# Patient Record
Sex: MALE | Race: BLACK OR AFRICAN AMERICAN | NOT HISPANIC OR LATINO | Employment: FULL TIME | URBAN - METROPOLITAN AREA
[De-identification: names, ages, dates, MRNs, and addresses within clinical notes are randomized per-mention and may not be internally consistent; named-entity substitution may affect disease eponyms.]

---

## 2018-09-19 ENCOUNTER — HOSPITAL ENCOUNTER (EMERGENCY)
Facility: HOSPITAL | Age: 18
Discharge: HOME | End: 2018-09-19
Attending: INTERNAL MEDICINE
Payer: MEDICAID

## 2018-09-19 VITALS
HEIGHT: 70 IN | HEART RATE: 85 BPM | WEIGHT: 135 LBS | DIASTOLIC BLOOD PRESSURE: 68 MMHG | SYSTOLIC BLOOD PRESSURE: 114 MMHG | TEMPERATURE: 98 F | RESPIRATION RATE: 17 BRPM | BODY MASS INDEX: 19.33 KG/M2 | OXYGEN SATURATION: 100 %
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
## 2018-09-19 DIAGNOSIS — L03.90 CELLULITIS, UNSPECIFIED CELLULITIS SITE: ICD-10-CM

## 2018-09-19 PROCEDURE — 99283 EMERGENCY DEPT VISIT LOW MDM: CPT

## 2018-09-19 RX ORDER — CETIRIZINE HYDROCHLORIDE 10 MG/1
10 TABLET ORAL DAILY PRN
Qty: 30 TABLET | Refills: 0 | Status: SHIPPED | OUTPATIENT
Start: 2018-09-19 | End: 2018-10-19

## 2018-09-19 RX ORDER — CLINDAMYCIN HYDROCHLORIDE 300 MG/1
300 CAPSULE ORAL EVERY 6 HOURS
Qty: 40 CAPSULE | Refills: 0 | Status: SHIPPED | OUTPATIENT
Start: 2018-09-19 | End: 2018-09-29

## 2018-09-20 NOTE — ED PROVIDER NOTES
"Encounter Date: 9/19/2018    SCRIBE #1 NOTE: I, Nicanor West, am scribing for, and in the presence of,  LANDY Costa. I have scribed the following portions of the note - Other sections scribed: HPI, ROS, PE.       History     Chief Complaint   Patient presents with    Leg Pain     red, raised lesion to right lower leg. Pt states he thinks he "got bit by a spider,'     This is a 18 y.o. male who presents to the ED with a complaint of redness and swelling to the right lower leg that began earlier today at 1 or 2 PM. Patient thinks that he was bit by a spider and notes that his symptoms of the redness and swelling have progressively worsened since the onset of his bite.  He notes that this area itched intially, but denies any itchiness currently.   He notes that palpating the area worsens this area of pain, and that nothing makes it better. He denies taking any medication since his problems began.  He denies fever, chills chest pains or shortness of breath.      The history is provided by the patient.     Review of patient's allergies indicates:  No Known Allergies  History reviewed. No pertinent past medical history.  History reviewed. No pertinent surgical history.  History reviewed. No pertinent family history.  Social History     Tobacco Use    Smoking status: Never Smoker    Smokeless tobacco: Never Used   Substance Use Topics    Alcohol use: No    Drug use: No     Review of Systems   Constitutional: Negative.  Negative for activity change and fever.   HENT: Negative.  Negative for trouble swallowing.    Eyes: Negative.  Negative for visual disturbance.   Respiratory: Negative.  Negative for shortness of breath.    Cardiovascular: Negative.  Negative for chest pain and palpitations.   Gastrointestinal: Negative.  Negative for abdominal pain.   Endocrine: Negative.    Genitourinary: Negative.    Musculoskeletal: Negative.         Positive for an area of swelling to the right lower leg   Skin: Positive for " color change (Redness) and wound (Insect bite).   Allergic/Immunologic: Negative.    Neurological: Negative.    Hematological: Negative.    All other systems reviewed and are negative.      Physical Exam     Initial Vitals [09/19/18 2037]   BP Pulse Resp Temp SpO2   116/71 89 18 98.3 °F (36.8 °C) (!) 89 %      MAP       --         Physical Exam    Nursing note and vitals reviewed.  Constitutional: He appears well-developed and well-nourished.   HENT:   Head: Normocephalic.   Right Ear: External ear normal.   Left Ear: External ear normal.   Nose: Nose normal.   Mouth/Throat: Uvula is midline, oropharynx is clear and moist and mucous membranes are normal.   Eyes: Conjunctivae are normal.   Neck: Normal range of motion. Neck supple.   Cardiovascular: Normal rate, regular rhythm, normal heart sounds and intact distal pulses. Exam reveals no gallop and no friction rub.    No murmur heard.  Pulmonary/Chest: Breath sounds normal. No respiratory distress. He has no wheezes. He has no rhonchi. He has no rales.   Abdominal: Soft. Bowel sounds are normal. He exhibits no distension and no mass. There is no tenderness. There is no rebound and no guarding.   Musculoskeletal: Normal range of motion.        Legs:  1 cm x 1 cm erythematous area, warm to touch, tender to palpation. No fluctuance or induration noted.   Neurological: He is alert and oriented to person, place, and time.   Skin: Skin is warm and dry. Capillary refill takes less than 2 seconds. There is erythema.   Erythematous area that is warm to the touch with associated tenderness.    Psychiatric: He has a normal mood and affect. His behavior is normal.         ED Course   Procedures              Medical Decision Making:   Initial Assessment:   A 18-year-old male presents to the ED with complaints of insect bite to right lower extremity.  Patient states the bite happened around 1:00 or 2:00 p.m. at work.  Patient unsure of with bit him.  He denies fever, chills chest  pains or shortness of breath.  Differential Diagnosis:   Insect bite  Infected insect bite  Allergic reaction  ED Management:  Physical exam.  Patient discharged home with clindamycin and Zyrtec.  Follow-up with PCP in 1-2 days.  Return ED for worsening of symptoms.            Scribe Attestation:   Scribe #1: I performed the above scribed service and the documentation accurately describes the services I performed. I attest to the accuracy of the note.               Clinical Impression:     1. Insect bite, initial encounter    2. Cellulitis, unspecified cellulitis site                                   LANDY Savage  09/19/18 3493

## 2022-04-19 ENCOUNTER — HOSPITAL ENCOUNTER (EMERGENCY)
Facility: HOSPITAL | Age: 22
Discharge: HOME | End: 2022-04-19
Attending: EMERGENCY MEDICINE

## 2022-04-19 VITALS
DIASTOLIC BLOOD PRESSURE: 89 MMHG | BODY MASS INDEX: 20.04 KG/M2 | OXYGEN SATURATION: 100 % | SYSTOLIC BLOOD PRESSURE: 142 MMHG | HEART RATE: 77 BPM | RESPIRATION RATE: 19 BRPM | TEMPERATURE: 99 F | WEIGHT: 140 LBS | HEIGHT: 70 IN

## 2022-04-19 DIAGNOSIS — S59.901A ELBOW INJURY, RIGHT, INITIAL ENCOUNTER: ICD-10-CM

## 2022-04-19 DIAGNOSIS — S42.401A CLOSED FRACTURE OF RIGHT ELBOW, INITIAL ENCOUNTER: Primary | ICD-10-CM

## 2022-04-19 PROCEDURE — 25000003 PHARM REV CODE 250: Mod: ER | Performed by: PHYSICIAN ASSISTANT

## 2022-04-19 PROCEDURE — 99283 EMERGENCY DEPT VISIT LOW MDM: CPT | Mod: 25,ER

## 2022-04-19 PROCEDURE — 29105 APPLICATION LONG ARM SPLINT: CPT | Mod: RT,ER

## 2022-04-19 RX ORDER — OXYCODONE AND ACETAMINOPHEN 5; 325 MG/1; MG/1
1 TABLET ORAL EVERY 6 HOURS PRN
Qty: 12 TABLET | Refills: 0 | Status: SHIPPED | OUTPATIENT
Start: 2022-04-19 | End: 2022-04-22

## 2022-04-19 RX ORDER — ACETAMINOPHEN 500 MG
1000 TABLET ORAL
Status: COMPLETED | OUTPATIENT
Start: 2022-04-19 | End: 2022-04-19

## 2022-04-19 RX ADMIN — ACETAMINOPHEN 1000 MG: 500 TABLET ORAL at 01:04

## 2022-04-19 NOTE — FIRST PROVIDER EVALUATION
Emergency Department TeleTriage Encounter Note      CHIEF COMPLAINT    Chief Complaint   Patient presents with    Elbow Pain     Pt was playing basketball and fell and caught himself with his right arm. Pt states he is having right elbow pain since       VITAL SIGNS   Initial Vitals [04/19/22 1228]   BP Pulse Resp Temp SpO2   132/85 73 18 98.3 °F (36.8 °C) 100 %      MAP       --            ALLERGIES    Review of patient's allergies indicates:   Allergen Reactions    Advil [ibuprofen]     Aleve [naproxen sodium]        PROVIDER TRIAGE NOTE    Patient presents to the ER with complaint of right elbow pain since an injury while playing basketball yesterday.  He fell back and caught himself with his right arm.  He has not taken any pain medications, pain is currently rated 7/10.  Will order motrin and xray of right elbow and forearm pending ED provider evaluation.     Initial orders will be placed and care will be transferred to an alternate provider when patient is roomed for a full evaluation. Any additional orders and the final disposition will be determined by that provider.         ORDERS  Labs Reviewed - No data to display    ED Orders (720h ago, onward)    None            Virtual Visit Note: The provider triage portion of this emergency department evaluation and documentation was performed via [employer], a HIPAA-compliant telemedicine application, in concert with a tele-presenter in the room. A face to face patient evaluation with one of my colleagues will occur once the patient is placed in an emergency department room.      DISCLAIMER: This note was prepared with [employer] voice recognition transcription software. Garbled syntax, mangled pronouns, and other bizarre constructions may be attributed to that software system.

## 2022-04-19 NOTE — Clinical Note
"Tarik Shaffer"Mick was seen and treated in our emergency department on 4/19/2022.  He may return to work on 04/21/2022.       If you have any questions or concerns, please don't hesitate to call.      LANDY Perez"

## 2022-04-19 NOTE — ED PROVIDER NOTES
Encounter Date: 4/19/2022       History     Chief Complaint   Patient presents with    Elbow Pain     Pt was playing basketball and fell and caught himself with his right arm. Pt states he is having right elbow pain since     21 y/o male presents to the ED with complaints of right elbow pain.  Patient states that he was playing basketball yesterday and fell landing on the elbow.  Has been having pain since.  Patient denies head injury, LOC, AMS, neck pain, rash, fever, chest pain, SOB, numbness, weakness, tingling, abdominal pain, back pain, dysuria, hematuria, nausea, vomiting, diarrhea, or any other complaints.  Patient rates the pain as 8/10 and has not taken any medications for the symptoms.  No Alleviating/aggravating factors.            The history is provided by the patient.     Review of patient's allergies indicates:   Allergen Reactions    Advil [ibuprofen]     Aleve [naproxen sodium]      History reviewed. No pertinent past medical history.  History reviewed. No pertinent surgical history.  History reviewed. No pertinent family history.  Social History     Tobacco Use    Smoking status: Never Smoker    Smokeless tobacco: Never Used   Substance Use Topics    Alcohol use: No    Drug use: No     Review of Systems   Constitutional: Negative for chills and fever.   HENT: Negative for congestion, ear pain, rhinorrhea and sore throat.    Eyes: Negative for pain, discharge and redness.   Respiratory: Negative for cough and shortness of breath.    Cardiovascular: Negative for chest pain.   Gastrointestinal: Negative for abdominal pain, diarrhea, nausea and vomiting.   Genitourinary: Negative for dysuria.   Musculoskeletal: Positive for arthralgias. Negative for back pain, neck pain and neck stiffness.   Skin: Negative for rash.   Neurological: Negative for dizziness, weakness, light-headedness, numbness and headaches.   Psychiatric/Behavioral: Negative for confusion.       Physical Exam     Initial Vitals  [04/19/22 1228]   BP Pulse Resp Temp SpO2   132/85 73 18 98.3 °F (36.8 °C) 100 %      MAP       --         Physical Exam    Nursing note and vitals reviewed.  Constitutional: Vital signs are normal. He appears well-developed. He is cooperative.  Non-toxic appearance. He does not appear ill.   HENT:   Head: Normocephalic and atraumatic.   Right Ear: External ear normal.   Left Ear: External ear normal.   Nose: Nose normal.   Mouth/Throat: Oropharynx is clear and moist.   Eyes: Conjunctivae are normal.   Neck:   Normal range of motion.  Cardiovascular: Normal rate and regular rhythm.   Pulmonary/Chest: Effort normal and breath sounds normal.   Musculoskeletal:      Right elbow: Swelling present. Decreased range of motion. Tenderness present.      Cervical back: Normal range of motion.      Comments: Right elbow tenderness to palpation with mild swelling; decreased ROM due to pain, strength, and sensation; + radial pulse; capillary refill < 2 seconds; no erythema rash, bruising, or obvious deformity     Neurological: He is alert and oriented to person, place, and time. GCS eye subscore is 4. GCS verbal subscore is 5. GCS motor subscore is 6.   Skin: Skin is warm, dry and intact. No rash noted.   Psychiatric: He has a normal mood and affect. His speech is normal and behavior is normal. Thought content normal.         ED Course   Orthopedic Injury    Date/Time: 4/19/2022 2:29 PM  Performed by: LANDY Perez  Authorized by: Debora Munoz MD     Location procedure was performed:  [facility] EMERGENCY DEPARTMENT  Injury:     Injury location:  Elbow    Location details:  Right elbow    Injury type:  Fracture    Fracture type: radial head      Fracture type: radial head        Pre-procedure assessment:     Neurovascular status: Neurovascularly intact      Distal perfusion: normal      Neurological function: normal      Range of motion: reduced        Selections made in this section will also lock the Injury type section above.:      Manipulation performed?: No      Immobilization:  Splint and sling    Splint type:  Long arm    Supplies used:  Ortho-Glass    Complications: No      Estimated blood loss (mL):  0    Specimens: No      Implants: No    Post-procedure assessment:     Neurovascular status: Neurovascularly intact      Distal perfusion: normal      Neurological function: normal      Range of motion: splinted      Patient tolerance:  Patient tolerated the procedure well with no immediate complications      Labs Reviewed - No data to display       Imaging Results          X-Ray Elbow Complete Right (Final result)  Result time 04/19/22 13:44:14    Final result by Giuseppe Aragon MD (04/19/22 13:44:14)                 Impression:      Questionable nonspecific elbow joint effusion and questionable small avulsion/chip fracture of the medial epicondyle versus projectional artifact, and in the setting of recent trauma occult radial head fracture not excluded.      Electronically signed by: Giuseppe Aragon MD  Date:    04/19/2022  Time:    13:44             Narrative:    EXAMINATION:  XR ELBOW COMPLETE 3 VIEW RIGHT; XR FOREARM RIGHT    CLINICAL HISTORY:  . Unspecified injury of right elbow, initial encounter    TECHNIQUE:  AP, lateral, and oblique views of the right elbow; AP and lateral views right forearm    COMPARISON:  None    FINDINGS:  Bones are well mineralized.  Slight ulnar minus variance.  Slight prominence of the anterior and posterior fat pads which may reflect an elbow joint effusion.  Small linear ossific density adjacent to the medial condyle/epicondylar region without donor site seen which could represent artifact from overlapping osseous interface versus small chip/avulsion fracture.  No dislocation or destructive osseous process identified.  Joint spaces appear relatively maintained.  No subcutaneous emphysema or radiodense retained foreign body.                               X-Ray Forearm Right (Final result)  Result time  04/19/22 13:44:14    Final result by Giuseppe Aragon MD (04/19/22 13:44:14)                 Impression:      Questionable nonspecific elbow joint effusion and questionable small avulsion/chip fracture of the medial epicondyle versus projectional artifact, and in the setting of recent trauma occult radial head fracture not excluded.      Electronically signed by: Giuseppe Aragon MD  Date:    04/19/2022  Time:    13:44             Narrative:    EXAMINATION:  XR ELBOW COMPLETE 3 VIEW RIGHT; XR FOREARM RIGHT    CLINICAL HISTORY:  . Unspecified injury of right elbow, initial encounter    TECHNIQUE:  AP, lateral, and oblique views of the right elbow; AP and lateral views right forearm    COMPARISON:  None    FINDINGS:  Bones are well mineralized.  Slight ulnar minus variance.  Slight prominence of the anterior and posterior fat pads which may reflect an elbow joint effusion.  Small linear ossific density adjacent to the medial condyle/epicondylar region without donor site seen which could represent artifact from overlapping osseous interface versus small chip/avulsion fracture.  No dislocation or destructive osseous process identified.  Joint spaces appear relatively maintained.  No subcutaneous emphysema or radiodense retained foreign body.                                 Medications   acetaminophen tablet 1,000 mg (1,000 mg Oral Given 4/19/22 1302)           APC / Resident Notes:   This is an evaluation of a 22 y.o. male that presents to the Emergency Department for rigth elbow injury    Physical Exam shows a non-toxic, afebrile, and well appearing male. Right elbow tenderness to palpation with mild swelling; decreased ROM due to pain, strength, and sensation; + radial pulse; capillary refill < 2 seconds; no erythema rash, bruising, or obvious deformity    Vital signs are reassuring. If available, previous records reviewed.   RESULTS: Xray showed a questionable right elbow fracture     My overall impression is right elbow  fracture. I considered, but at this time, do not suspect dislocation, septic joint, abscess, cellulitis.    ED Course: Xray, Ice, Tylenol, splint, sling. Discharge Meds/Instructions: Percocet, ortho referral placed, disk given to patient. The diagnosis, treatment plan, instructions for follow-up as well as ED return precautions were discussed. All questions have been answered.     This case was discussed with Dr. Munoz who is in agreement with my assessment and plan.                    Clinical Impression:   Final diagnoses:  [S59.901A] Elbow injury, right, initial encounter  [S42.401A] Closed fracture of right elbow, initial encounter (Primary)          ED Disposition Condition    Discharge Stable        ED Prescriptions     Medication Sig Dispense Start Date End Date Auth. Provider    oxyCODONE-acetaminophen (PERCOCET) 5-325 mg per tablet Take 1 tablet by mouth every 6 (six) hours as needed for Pain. 12 tablet 4/19/2022 4/22/2022 LANDY Perez        Follow-up Information     Follow up With Specialties Details Why Contact [facility] - Musculosketetal Neuromusculoskeletal Medicine Schedule an appointment as soon as possible for a visit in 2 days  [address] [facility] [address]  [phone]      Amy Vincent MD Orthopedic Surgery Schedule an appointment as soon as possible for a visit in 2 days  [address] [facility] [address] [phone]      [facility] ED Emergency Medicine Go to  If symptoms worsen [address] [facility] [address] [phone]           LANDY Perez  04/19/22 3446

## 2022-04-27 ENCOUNTER — HOSPITAL ENCOUNTER (EMERGENCY)
Facility: HOSPITAL | Age: 22
Discharge: HOME | End: 2022-04-27
Attending: EMERGENCY MEDICINE

## 2022-04-27 VITALS
TEMPERATURE: 98 F | HEART RATE: 60 BPM | WEIGHT: 140 LBS | RESPIRATION RATE: 18 BRPM | SYSTOLIC BLOOD PRESSURE: 100 MMHG | OXYGEN SATURATION: 100 % | DIASTOLIC BLOOD PRESSURE: 79 MMHG | BODY MASS INDEX: 20.09 KG/M2

## 2022-04-27 DIAGNOSIS — S42.401D CLOSED FRACTURE DISLOCATION OF RIGHT ELBOW WITH ROUTINE HEALING, SUBSEQUENT ENCOUNTER: Primary | ICD-10-CM

## 2022-04-27 PROCEDURE — 25000003 PHARM REV CODE 250: Mod: ER | Performed by: EMERGENCY MEDICINE

## 2022-04-27 PROCEDURE — 99284 EMERGENCY DEPT VISIT MOD MDM: CPT | Mod: ER

## 2022-04-27 RX ORDER — CYCLOBENZAPRINE HCL 10 MG
10 TABLET ORAL 3 TIMES DAILY PRN
Qty: 15 TABLET | Refills: 0 | Status: SHIPPED | OUTPATIENT
Start: 2022-04-27 | End: 2022-05-02

## 2022-04-27 RX ORDER — TRAMADOL HYDROCHLORIDE 50 MG/1
50 TABLET ORAL EVERY 6 HOURS PRN
Qty: 10 TABLET | Refills: 0 | Status: SHIPPED | OUTPATIENT
Start: 2022-04-27

## 2022-04-27 RX ORDER — IBUPROFEN 600 MG/1
600 TABLET ORAL EVERY 6 HOURS PRN
Qty: 20 TABLET | Refills: 0 | Status: SHIPPED | OUTPATIENT
Start: 2022-04-27

## 2022-04-27 RX ORDER — KETOROLAC TROMETHAMINE 10 MG/1
10 TABLET, FILM COATED ORAL
Status: COMPLETED | OUTPATIENT
Start: 2022-04-27 | End: 2022-04-27

## 2022-04-27 RX ORDER — ACETAMINOPHEN 500 MG
1000 TABLET ORAL EVERY 6 HOURS PRN
Qty: 30 TABLET | Refills: 0 | Status: SHIPPED | OUTPATIENT
Start: 2022-04-27 | End: 2023-10-24 | Stop reason: SDUPTHER

## 2022-04-27 RX ADMIN — KETOROLAC TROMETHAMINE 10 MG: 10 TABLET, FILM COATED ORAL at 06:04

## 2022-04-27 NOTE — Clinical Note
"Tarik "Tarik"Mick was seen and treated in our emergency department on 4/27/2022.  He may return to work on 04/29/2022.       If you have any questions or concerns, please don't hesitate to call.      Roro Vasquez, DO"
17

## 2022-04-27 NOTE — FIRST PROVIDER EVALUATION
Medical screening exam completed.  I have conducted a focused provider triage encounter, findings are as follows:    Brief history of present illness:  Fracture to right elbow on 4/20/2022; out of percocet, requesting a refill for pain meds; has appointment with ortho in 2 weeks; no new injury and no worsening pain    Vitals:    04/27/22 1624   BP: 112/76   BP Location: Right arm   Patient Position: Sitting   Pulse: 60   Resp: 16   Temp: 98 °F (36.7 °C)   TempSrc: Oral   SpO2: 100%   Weight: 63.5 kg (140 lb)       Pertinent physical exam:  Long arm splint in place on the right    Brief workup plan:  Will defer further treatment to provider seeing patient in room    Preliminary workup initiated; this workup will be continued and followed by the physician or advanced practice provider that is assigned to the patient when roomed.

## 2022-04-27 NOTE — ED PROVIDER NOTES
"Encounter Date: 4/27/2022    SCRIBE #1 NOTE: I, Jim Choudhary, am scribing for, and in the presence of,  Roro Vasquez DO. I have scribed the following portions of the note - Other sections scribed: HPI, ROS, PE.       History     Chief Complaint   Patient presents with    Med Change     Pt states, " I want more and stronger pain medication for my arm."     22 y.o. male, with a splint on the left arm, presents to the ED for chief complaint of medication adjustment. Patient reports he was prescribed Percocet for a fractured elbow that occurred approximately 9 days ago. Patient requests a medication adjustment of Percocet which he reports is not relieving his pain. Patient notes he is able to take Ibuprofen without complications but states when he takes specific Advil brand, he reports his eye gets low but there is no erythema, pain, rash, or swelling.         The history is provided by the patient. No  was used.     Review of patient's allergies indicates:   Allergen Reactions    Advil [ibuprofen]     Aleve [naproxen sodium]      No past medical history on file.  No past surgical history on file.  No family history on file.  Social History     Tobacco Use    Smoking status: Never Smoker    Smokeless tobacco: Never Used   Substance Use Topics    Alcohol use: No    Drug use: No     Review of Systems   Constitutional: Negative for fever.   HENT: Negative for rhinorrhea and sore throat.    Respiratory: Negative for shortness of breath.    Cardiovascular: Negative for leg swelling.   Skin: Negative for rash.   Neurological: Negative for numbness.   All other systems reviewed and are negative.      Physical Exam     Initial Vitals [04/27/22 1624]   BP Pulse Resp Temp SpO2   112/76 60 16 98 °F (36.7 °C) 100 %      MAP       --         Patient gave consent to have physical exam performed.    Physical Exam    Nursing note and vitals reviewed.  Constitutional: He appears well-developed and " well-nourished.   HENT:   Head: Normocephalic and atraumatic.   Right Ear: External ear normal.   Left Ear: External ear normal.   Nose: Nose normal.   Mouth/Throat: Oropharynx is clear and moist.   Eyes: Conjunctivae and EOM are normal. Pupils are equal, round, and reactive to light.   Neck: Neck supple.   Normal range of motion.  Cardiovascular: Normal rate, regular rhythm and normal heart sounds. Exam reveals no gallop and no friction rub.    No murmur heard.  Pulmonary/Chest: Effort normal and breath sounds normal. No respiratory distress. He has no wheezes. He has no rhonchi. He has no rales.   Abdominal: Abdomen is soft. Bowel sounds are normal. There is no abdominal tenderness. There is no rebound and no guarding.   Musculoskeletal:         General: No tenderness or edema. Normal range of motion.      Cervical back: Normal range of motion and neck supple.     Neurological: He is alert and oriented to person, place, and time. No cranial nerve deficit.   Skin: Skin is warm and dry. Capillary refill takes less than 2 seconds. No rash noted.   Psychiatric: He has a normal mood and affect. His behavior is normal.         ED Course   Procedures  Labs Reviewed - No data to display       Imaging Results    None          Medications   ketorolac tablet 10 mg (10 mg Oral Given 4/27/22 1806)     Medical Decision Making:   History:   Old Medical Records: I decided to obtain old medical records.  Chief complaint: Medication adjustment of Percocet which is not working for pain    Treatment in the ED: PE, Ketorolac  Patient reports feeling better after treatment in the ER.      Discussed treatment and prescriptions.    Discharge home with Tylenol 500 MG, Flexeril 10 MG, Ibuprofen 600 MG, Ultram 50 MG  Fill and take prescriptions as directed.  Return to the ED if symptoms worsen or do not resolve.   Answered questions and discussed discharge plan.    Patient feels better and is ready for discharge.  Follow up with  PCP/specialist in 1 day.          Scribe Attestation:   Scribe #1: I performed the above scribed service and the documentation accurately describes the services I performed. I attest to the accuracy of the note.                I, Dr. Roro Vasquez, personally performed the services described in this documentation. This document was produced by a scribe under my direction and in my presence. All medical record entries made by the scribe were at my direction and in my presence.  I have reviewed the chart and agree that the record reflects my personal performance and is accurate and complete. Roro Vasquez, DO.     04/27/2022 9:48 PM    Clinical Impression:   Final diagnoses:  [S42.401D] Closed fracture dislocation of right elbow with routine healing, subsequent encounter (Primary)          ED Disposition Condition    Discharge Stable        ED Prescriptions     Medication Sig Dispense Start Date End Date Auth. Provider    ibuprofen (ADVIL,MOTRIN) 600 MG tablet Take 1 tablet (600 mg total) by mouth every 6 (six) hours as needed for Pain (Take with food as needed for mild-to-moderate pain). 20 tablet 4/27/2022  Roro Vasquez, DO    acetaminophen (TYLENOL) 500 MG tablet Take 2 tablets (1,000 mg total) by mouth every 6 (six) hours as needed for Pain. 30 tablet 4/27/2022  Roro Vasquez DO    cyclobenzaprine (FLEXERIL) 10 MG tablet Take 1 tablet (10 mg total) by mouth 3 (three) times daily as needed for Muscle spasms. 15 tablet 4/27/2022 5/2/2022 Roro Vasquez DO    traMADoL (ULTRAM) 50 mg tablet Take 1 tablet (50 mg total) by mouth every 6 (six) hours as needed for Pain (As needed for severe 10/10 pain). 10 tablet 4/27/2022  Roro Vasquez DO        Follow-up Information     Follow up With Specialties Details Why Contact Info    [facility]  Schedule an appointment as soon as possible for a visit in 1 day Please establish a primary care physician [address] [place] [address]  [phone]       [facility] - ED  Go to  If symptoms worsen [address] [facility] [address]           Roro Vasquez,   04/27/22 2149

## 2023-10-24 ENCOUNTER — HOSPITAL ENCOUNTER (EMERGENCY)
Facility: HOSPITAL | Age: 23
Discharge: HOME | End: 2023-10-24
Attending: EMERGENCY MEDICINE

## 2023-10-24 VITALS
SYSTOLIC BLOOD PRESSURE: 131 MMHG | RESPIRATION RATE: 20 BRPM | DIASTOLIC BLOOD PRESSURE: 89 MMHG | HEIGHT: 70 IN | OXYGEN SATURATION: 100 % | WEIGHT: 145 LBS | HEART RATE: 94 BPM | BODY MASS INDEX: 20.76 KG/M2 | TEMPERATURE: 98 F

## 2023-10-24 DIAGNOSIS — S20.212A RIB CONTUSION, LEFT, INITIAL ENCOUNTER: Primary | ICD-10-CM

## 2023-10-24 PROCEDURE — 99283 EMERGENCY DEPT VISIT LOW MDM: CPT | Mod: ER

## 2023-10-24 RX ORDER — ACETAMINOPHEN 500 MG
1000 TABLET ORAL EVERY 6 HOURS PRN
Qty: 30 TABLET | Refills: 0 | Status: SHIPPED | OUTPATIENT
Start: 2023-10-24

## 2023-10-24 NOTE — DISCHARGE INSTRUCTIONS
X-rays do not show rib fracture or lung abnormality.  Symptoms likely related to a bruised rib.  Use Tylenol as needed for pain.  Schedule close follow-up with a primary care physician.  Return to the emergency department for any new, worsening or significantly concerning symptoms    Thank you for coming to our Emergency Department today. It is important to remember that some problems are difficult to diagnose and may not be found during your first visit. Be sure to follow up with your primary care doctor and review any labs/imaging that was performed with them. If you do not have a primary care doctor, you may contact the one listed on your discharge paperwork or you may also call the [facility] Appointment Desk at [phone] to schedule an appointment with one.     All medications may potentially have side effects and it is impossible to predict which medications may give you side effects. If you feel that you are having a negative effect of any medication you should immediately stop taking them and seek medical attention.    Return to the ER with any questions/concerns, new/concerning symptoms, worsening or failure to improve. Do not drive or make any important decisions for 24 hours if you have received any pain medications, sedatives or mood altering drugs during your ER visit.

## 2023-10-24 NOTE — ED PROVIDER NOTES
Encounter Date: 10/24/2023       History     Chief Complaint   Patient presents with    Rib Injury     Patient presents w/ a c/o of left sided rib injury r/t playing basketball yesterday. 2/10 pain.     24yo male with no significant past medical history presents to the emergency department for evaluation of left-sided rib pain that has been going on for the past 3 weeks.  Patient believes he may have sustained an injury while playing basketball.  He denies current pain in the area.  No shortness of breath no nausea, no vomiting no diarrhea no dysuria.      Review of patient's allergies indicates:   Allergen Reactions    Advil [ibuprofen]     Aleve [naproxen sodium]      No past medical history on file.  No past surgical history on file.  No family history on file.  Social History     Tobacco Use    Smoking status: Never    Smokeless tobacco: Never   Substance Use Topics    Alcohol use: No    Drug use: No     Review of Systems   Constitutional:  Negative for fever.   HENT:  Negative for facial swelling and sore throat.    Eyes:  Negative for pain and discharge.   Respiratory:  Negative for choking and shortness of breath.    Cardiovascular:  Negative for chest pain.        Left-sided lower rib pain   Gastrointestinal:  Negative for abdominal pain, nausea and vomiting.   Genitourinary:  Negative for dysuria and frequency.   Musculoskeletal:  Positive for arthralgias (rib pain on L). Negative for back pain.   Skin:  Negative for rash.   Neurological:  Negative for weakness and numbness.       Physical Exam     Initial Vitals [10/24/23 0923]   BP Pulse Resp Temp SpO2   (!) 133/90 100 20 98.3 °F (36.8 °C) 100 %      MAP       --         Physical Exam    Nursing note and vitals reviewed.  Constitutional: He is not diaphoretic. No distress.   HENT:   Head: Normocephalic and atraumatic.   Protecting airway   Eyes: Conjunctivae and EOM are normal. No scleral icterus.   Neck: Neck supple. No tracheal deviation present.    Normal range of motion.  Cardiovascular:  Normal rate, regular rhythm and intact distal pulses.           Pulmonary/Chest: No stridor. No respiratory distress.   Speaking in full sentences   Abdominal: Abdomen is soft. He exhibits no distension. There is no abdominal tenderness.   Musculoskeletal:         General: No tenderness or edema.      Cervical back: Normal range of motion and neck supple.     Neurological: He is alert. He has normal strength. No cranial nerve deficit or sensory deficit.   Skin: Skin is warm and dry.   Psychiatric: He has a normal mood and affect.         ED Course   Procedures  Labs Reviewed - No data to display       Imaging Results              X-Ray Ribs 2 View Left (Final result)  Result time 10/24/23 10:18:29      Final result by Zain Vega MD (10/24/23 10:18:29)                   Impression:      No evidence for acute fracture or bone destruction.      Electronically signed by: Zain Vega MD  Date:    10/24/2023  Time:    10:18               Narrative:    EXAMINATION:  XR RIBS 2 VIEW LEFT    CLINICAL HISTORY:  Injury, unspecified, initial encounter    TECHNIQUE:  Two views of the left ribs were performed.    COMPARISON:  None.    FINDINGS:  The left ribs appear intact.  There is no evidence for acute fracture or bone destruction.  There is no evidence for left-sided pneumothorax or left-sided pleural effusion.  Soft tissues are unremarkable.                                       Medications - No data to display  Medical Decision Making  Patient is afebrile and in no acute distress time history and physical.  He presents for evaluation of pain to the left lower ribs.  Reports remote injury while playing basketball.  Denies current pain.  No crepitation on physical exam.  Lungs are clear to auscultation.  X-ray without fracture or bony destruction.  Patient remains clinically stable in the emergency department.  He does not appear to require further emergent evaluation.  He  is fit for discharge to follow up with a primary care physician. counseled on supportive care, appropriate medication usage, concerning symptoms for which to return to ER and the importance of follow up. Understanding and agreement with treatment plan was expressed.     Risk  OTC drugs.                               Clinical Impression:   Final diagnoses:  [S20212A] Rib contusion, left, initial encounter (Primary)        ED Disposition Condition    Discharge Stable          ED Prescriptions       Medication Sig Dispense Start Date End Date Auth. Provider    acetaminophen (TYLENOL) 500 MG tablet Take 2 tablets (1,000 mg total) by mouth every 6 (six) hours as needed for Pain. 30 tablet 10/24/2023 -- Jimenez Gilbert MD          Follow-up Information       Follow up With Specialties Details Why Contact Info    Your Primary Physician  Schedule an appointment as soon as possible for a visit   Make an appointment to see your primary care physician as soon as possible for follow-up    [facility] -  Schedule an appointment as soon as possible for a visit   [address]  [place] [address]  [phone]               Jimenez Gilbert MD  10/24/23 1037

## 2023-12-31 ENCOUNTER — HOSPITAL ENCOUNTER (EMERGENCY)
Facility: HOSPITAL | Age: 23
Discharge: HOME | End: 2023-12-31
Attending: EMERGENCY MEDICINE

## 2023-12-31 VITALS
DIASTOLIC BLOOD PRESSURE: 79 MMHG | RESPIRATION RATE: 17 BRPM | HEART RATE: 87 BPM | TEMPERATURE: 99 F | SYSTOLIC BLOOD PRESSURE: 126 MMHG | WEIGHT: 145 LBS | OXYGEN SATURATION: 99 % | BODY MASS INDEX: 20.81 KG/M2

## 2023-12-31 DIAGNOSIS — K59.00 CONSTIPATION, UNSPECIFIED CONSTIPATION TYPE: Primary | ICD-10-CM

## 2023-12-31 PROCEDURE — 99283 EMERGENCY DEPT VISIT LOW MDM: CPT | Mod: ER

## 2023-12-31 RX ORDER — POLYETHYLENE GLYCOL 3350 17 G/17G
17 POWDER, FOR SOLUTION ORAL DAILY PRN
Qty: 116 G | Refills: 0 | Status: SHIPPED | OUTPATIENT
Start: 2023-12-31

## 2023-12-31 NOTE — DISCHARGE INSTRUCTIONS
Thank you for coming to our Emergency Department today. It is important to remember that some problems or medical conditions are difficult to diagnose and may not be found during your Emergency Department visit.     Be sure to follow up with your primary care doctor and review all labs/imaging/tests that were performed during your ER visit with them. Some labs/tests may be outside of the normal range and require non-emergent follow-up and further investigation to help diagnose/exclude/prevent complications or other potentially serious medical conditions that were not addressed during your ER visit.    If you do not have a primary care doctor, you may contact the one listed on your discharge paperwork or you may also call the [facility] Appointment Desk at [phone] to schedule an appointment and establish care with one. It is important to your health that you have a primary care doctor.    Please take all medications as directed. All medications may potentially have side-effects and it is impossible to predict which medications may give you side-effects or what side-effects (if any) they will give you.. If you feel that you are having a negative effect or side-effect of any medication you should immediately stop taking them and seek medical attention. If you feel that you are having a life-threatening reaction call 911.    Return to the ER with any questions/concerns, new/concerning symptoms, worsening or failure to improve.     Do not drive, swim, climb to height, take a bath, operate heavy machinery, drink alcohol or take potentially sedating medications, sign any legal documents or make any important decisions for 24 hours if you have received any pain medications, sedatives or mood altering drugs during your ER visit or within 24 hours of taking them if they have been prescribed to you.     You can find additional resources for Dentists, hearing aids, durable medical equipment, low cost pharmacies and  other resources at https://[facility].org    BELOW THIS LINE ONLY APPLIES IF YOU HAVE A COVID TEST PENDING OR IF YOU HAVE BEEN DIAGNOSED WITH COVID:  Please access [facility] to review the results of your test. Until the results of your COVID test return, you should isolate yourself so as not to potentially spread illness to others.   If your COVID test returns positive, you should isolate yourself so as not to spread illness to others. After five full days, if you are feeling better and you have not had fever for 24 hours, you can return to your typical daily activities, but you must wear a mask around others for an additional 5 days.   If your COVID test returns negative and you are either unvaccinated or more than six months out from your two-dose vaccine and are not yet boosted, you should still quarantine for 5 full days followed by strict mask use for an additional 5 full days.   If your COVID test returns negative and you have received your 2-dose initial vaccine as well as a booster, you should continue strict mask use for 10 full days after the exposure.  For all those exposed, best practice includes a test at day 5 after the exposure. This can be a home test or a test through one of the many testing centers throughout our community.   Masking is always advised to limit the spread of COVID. Cdc.gov is an excellent site to obtain the latest up to date recommendations regarding COVID and COVID testing.     CDC Testing and Quarantine Guidelines for patients with exposure to a known-positive COVID-19 person:  A close exposure is defined as anyone who has had an exposure (masked or unmasked) to a known COVID -19 positive person within 6 feet of someone for a cumulative total of 15 minutes or more over a 24-hour period.   Vaccinated and/or if you recently had a positive covid test within 90 days do NOT need to quarantine after contact with someone who had COVID-19 unless you develop symptoms.   Fully vaccinated  people who have not had a positive test within 90 days, should get tested 3-5 days after their exposure, even if they don't have symptoms and wear a mask indoors in public for 14 days following exposure or until their test result is negative.      Unvaccinated and/or NOT had a positive test within 90 days and meet close exposure  You are required by CDC guidelines to quarantine for at least 5 days from time of exposure followed by 5 days of strict masking. It is recommended, but not required to test after 5 days, unless you develop symptoms, in which case you should test at that time.  If you get tested after 5 days and your test is positive, your 5 day period of isolation starts the day of the positive test.    If your exposure does not meet the above definition, you can return to your normal daily activities to include social distancing, wearing a mask and frequent handwashing.      Here is a link to guidance from the CDC:  https://www.cdc.gov/media/releases/2021/s1227-isolation-quarantine-guidance.html      [place] Dept Of Health Testing Sites:  https://ldh.la.gov/page/3934      [facility] website with testing locations and guidance:  [email or web address]

## 2023-12-31 NOTE — ED PROVIDER NOTES
Encounter Date: 12/31/2023       History     Chief Complaint   Patient presents with    Abdominal Pain     Pt states he wants to get his stomach checked out. Pt denies all symptoms- denies abdominal pain, denies vomitting, denies diarrhea, denies penile discharge, denies urinary complaints. Pt in no acute distress      CC: Constipation    HPI:  23-year-old male presenting to the ED for evaluation of constipation.  Reports bowel movements have become irregular.  He reports he has a bowel movement once every 3 days.  He sometimes has some discomfort to the left side of his abdomen.  Currently denies any pain.  No associated fever, chills, chest pain, shortness breath, nausea, vomiting, urinary symptoms.  He has attempted no treatment prior to arrival.  He reports he drinks 1 bottle of water a day.  No abdominal surgeries.    The history is provided by the patient. No  was used.     Review of patient's allergies indicates:   Allergen Reactions    Advil [ibuprofen]     Aleve [naproxen sodium]      No past medical history on file.  No past surgical history on file.  No family history on file.  Social History     Tobacco Use    Smoking status: Never    Smokeless tobacco: Never   Substance Use Topics    Alcohol use: No    Drug use: No     Review of Systems   Constitutional:  Negative for chills and fever.   HENT:  Negative for sore throat.    Respiratory:  Negative for shortness of breath.    Cardiovascular:  Negative for chest pain.   Gastrointestinal:  Positive for constipation. Negative for abdominal pain, nausea and vomiting.   Genitourinary:  Negative for dysuria.   Musculoskeletal:  Negative for back pain.   Skin:  Negative for rash.   Neurological:  Negative for weakness.   Hematological:  Does not bruise/bleed easily.       Physical Exam     Initial Vitals [12/31/23 1131]   BP Pulse Resp Temp SpO2   (!) 145/72 92 17 97.8 °F (36.6 °C) 100 %      MAP       --         Physical Exam    Vitals  reviewed.  Constitutional: He appears well-developed and well-nourished. He is not diaphoretic.  Non-toxic appearance. He does not have a sickly appearance. He does not appear ill. No distress.   HENT:   Head: Normocephalic and atraumatic.   Right Ear: External ear normal.   Left Ear: External ear normal.   Neck:   Normal range of motion.  Cardiovascular:  Normal rate, regular rhythm, normal heart sounds and intact distal pulses.           Pulmonary/Chest: Breath sounds normal. No respiratory distress.   Abdominal: Abdomen is soft. Bowel sounds are normal. There is no abdominal tenderness.   Abdomen is soft.  Nontender.  No guarding or rigidity.  Bowel sounds are active.   Musculoskeletal:         General: Normal range of motion.      Cervical back: Normal range of motion.     Neurological: He is alert and oriented to person, place, and time. GCS eye subscore is 4. GCS verbal subscore is 5. GCS motor subscore is 6.   Skin: Skin is warm, dry and intact. No rash noted. No erythema.   Psychiatric: He has a normal mood and affect. Thought content normal.         ED Course   Procedures  Labs Reviewed - No data to display       Imaging Results    None          Medications - No data to display  Medical Decision Making  23-year-old male presenting to the ED with constipation.  Reports intermittent left-sided abdominal pain.  Differentials include constipation, fecal impaction, bowel obstruction, ileus, diverticulitis, pancreatitis, others.  Full physical exam as above.  Abdominal exam is benign without tenderness, guarding, rigidity.  Bowel sounds are active.  He has no pain currently.  No nausea vomiting.  I do not think this patient requires any emergent imaging or labs today.  Will start patient on MiraLax.  Follow up with PCP.  Return precautions were discussed with the patient who verbalized understanding.  He is agreeable to plan of care.    Risk  OTC drugs.                                      Clinical  Impression:  Final diagnoses:  [K59.00] Constipation, unspecified constipation type (Primary)          ED Disposition Condition    Discharge Stable          ED Prescriptions       Medication Sig Dispense Start Date End Date Auth. Provider    polyethylene glycol (GLYCOLAX) 17 gram/dose powder Take 17 g by mouth daily as needed for Constipation. 116 g 12/31/2023 -- Karmen Newman NP          Follow-up Information       Follow up With Specialties Details Why Contact Info    [facility] -  Schedule an appointment as soon as possible for a visit in 1 day For follow-up if you do not have a primary care doctor [address]  [place] [address]  [phone]      [facility] ED Emergency Medicine Go to  If symptoms worsen [address] [address] [phone]             Karmen Newman NP  12/31/23 1422

## 2024-01-31 ENCOUNTER — HOSPITAL ENCOUNTER (EMERGENCY)
Facility: HOSPITAL | Age: 24
Discharge: HOME | End: 2024-01-31
Attending: EMERGENCY MEDICINE

## 2024-01-31 VITALS
TEMPERATURE: 98 F | DIASTOLIC BLOOD PRESSURE: 82 MMHG | HEIGHT: 71 IN | RESPIRATION RATE: 20 BRPM | HEART RATE: 97 BPM | OXYGEN SATURATION: 98 % | SYSTOLIC BLOOD PRESSURE: 149 MMHG | WEIGHT: 145 LBS | BODY MASS INDEX: 20.3 KG/M2

## 2024-01-31 DIAGNOSIS — Z71.1 PHYSICALLY WELL BUT WORRIED: Primary | ICD-10-CM

## 2024-01-31 PROCEDURE — 99281 EMR DPT VST MAYX REQ PHY/QHP: CPT | Mod: ER

## 2024-01-31 NOTE — ED PROVIDER NOTES
Encounter Date: 1/31/2024       History     Chief Complaint   Patient presents with    Abdominal Pain     Reports left abd pain, for 1-2 months     Patient is a 24-year-old male who presents to the emergency department concerned about intermittent swelling to the left part of his abdomen.  Patient reports he feels like there is some swelling on the left side of his belly.  He reports it comes and goes.  He reports at times he notices it and at times he does not.  He reports he is come to the emergency department for this before and he was told he was constipated.  He reports he has been drinking plenty of water daily.  Reports he is having bowel movements every day without straining.  Denies any blood in his stool.  Denies fevers.  Denies nausea or vomiting.  Denies weight loss.  Reports he is eating and drinking normally.  Denies any abdominal pain.  Reports he does not notice the swelling currently.    The history is provided by the patient.     Review of patient's allergies indicates:   Allergen Reactions    Advil [ibuprofen]     Aleve [naproxen sodium]      No past medical history on file.  No past surgical history on file.  No family history on file.  Social History     Tobacco Use    Smoking status: Never    Smokeless tobacco: Never   Substance Use Topics    Alcohol use: No    Drug use: No     Review of Systems   Constitutional:  Negative for activity change, appetite change, chills, fatigue and fever.   HENT:  Negative for congestion, ear discharge, ear pain, postnasal drip, rhinorrhea, sore throat and trouble swallowing.    Respiratory:  Negative for cough and shortness of breath.    Cardiovascular:  Negative for chest pain.   Gastrointestinal:  Negative for abdominal pain, blood in stool, constipation, diarrhea, nausea and vomiting.   Genitourinary:  Negative for dysuria, flank pain and hematuria.   Musculoskeletal:  Negative for back pain, neck pain and neck stiffness.   Skin:  Negative for rash and wound.    Neurological:  Negative for dizziness, weakness, light-headedness and headaches.       Physical Exam     Initial Vitals [01/31/24 1330]   BP Pulse Resp Temp SpO2   (!) 149/82 97 20 98.2 °F (36.8 °C) 98 %      MAP       --         Physical Exam    Nursing note and vitals reviewed.  Constitutional: He appears well-developed and well-nourished. He is not diaphoretic.  Non-toxic appearance. No distress.   HENT:   Head: Normocephalic.   Right Ear: Hearing and external ear normal.   Left Ear: Hearing and external ear normal.   Nose: Nose normal.   Mouth/Throat: Uvula is midline, oropharynx is clear and moist and mucous membranes are normal. No oropharyngeal exudate.   Eyes: Conjunctivae are normal. Pupils are equal, round, and reactive to light.   Neck:   Normal range of motion.  Cardiovascular:  Normal rate and regular rhythm.           Pulmonary/Chest: Breath sounds normal.   Abdominal: Abdomen is soft. Bowel sounds are normal. He exhibits no distension and no mass. There is no abdominal tenderness.   No right CVA tenderness.  No left CVA tenderness. There is no rebound and no guarding.   Musculoskeletal:         General: Normal range of motion.      Cervical back: Normal range of motion.     Lymphadenopathy:     He has no cervical adenopathy.   Neurological: He is alert and oriented to person, place, and time. He has normal strength. No cranial nerve deficit or sensory deficit. GCS score is 15. GCS eye subscore is 4. GCS verbal subscore is 5. GCS motor subscore is 6.   Skin: Skin is warm and dry. Capillary refill takes less than 2 seconds.   Psychiatric: He has a normal mood and affect.         ED Course   Procedures  Labs Reviewed - No data to display       Imaging Results    None          Medications - No data to display  Medical Decision Making  Urgent evaluation of a 24-year-old healthy male who presents to the emergency department worried well.  Patient is afebrile, nontoxic-appearing, and hemodynamically  stable.  Asymptomatic currently.  On my exam his abdomen is completely soft and nontender.  Normal bowel sounds.  No hepatomegaly, splenomegaly noted.  Moist mucous membranes.  I did offer imaging and lab work.  Patient declines and reports he would rather just follow up outpatient with a stomach doctor.  Will place a GI referral.  He is given strict return precautions.                                      Clinical Impression:  Final diagnoses:  [Z71.1] Physically well but worried (Primary)          ED Disposition Condition    Discharge Stable          ED Prescriptions    None       Follow-up Information    None          Jennifer Lara PA-C  01/31/24 [id number]

## 2024-01-31 NOTE — DISCHARGE INSTRUCTIONS
Since you are having no symptoms today, I think it is safe that you follow-up outpatient with a stomach doctor.  I have placed a referral.  They should call you with an appointment.  The phone number is also on your paperwork.  If you develop any worsening symptoms or concerns return to the emergency department.  Thank you for allowing me to take care of you today.

## 2024-03-06 ENCOUNTER — OFFICE VISIT (OUTPATIENT)
Dept: GASTROENTEROLOGY | Facility: CLINIC | Age: 24
End: 2024-03-06

## 2024-03-06 VITALS — BODY MASS INDEX: 19.1 KG/M2 | HEIGHT: 71 IN | WEIGHT: 136.44 LBS

## 2024-03-06 DIAGNOSIS — K59.00 INFREQUENT BOWEL MOVEMENTS: Primary | ICD-10-CM

## 2024-03-06 PROCEDURE — 99999 PR PBB SHADOW E&M-EST. PATIENT-LVL III: CPT | Mod: PBBFAC,,,

## 2024-03-06 PROCEDURE — 99213 OFFICE O/P EST LOW 20 MIN: CPT | Mod: PBBFAC,PN

## 2024-03-06 PROCEDURE — 99203 OFFICE O/P NEW LOW 30 MIN: CPT | Mod: S$PBB,,,

## 2024-03-06 NOTE — PROGRESS NOTES
GASTROENTEROLOGY CLINIC NOTE    Reason for visit: The encounter diagnosis was Infrequent bowel movements.  Referring provider/PCP: No, Primary Doctor    HPI:  Tarik Barton is a 24 y.o. male here today for infrequent BM's. He presented to the ER in October for concern of rib fracture, xray negative for fracture but stool burden present. He reports increasing water intake with some improvement. Has BM's about 2-3x/week, doesn't feel he is emptying with BM's. Has some straining and increase in gas. Does note diarrhea/loose stools if nervous. He denies abd pain, no blood in stool. No known GI FH.     Prior Endoscopy:  EGD:  Colon:    (Portions of this note were dictated using voice recognition software and may contain dictation related errors in spelling/grammar/syntax not found on text review)    Review of Systems   Gastrointestinal:  Positive for constipation. Negative for abdominal pain and blood in stool.       Past Medical History: has no past medical history on file.    Past Surgical History: has no past surgical history on file.    Home medications:   Current Outpatient Medications on File Prior to Visit   Medication Sig Dispense Refill    acetaminophen (TYLENOL) 500 MG tablet Take 2 tablets (1,000 mg total) by mouth every 6 (six) hours as needed for Pain. 30 tablet 0    polyethylene glycol (GLYCOLAX) 17 gram/dose powder Take 17 g by mouth daily as needed for Constipation. 116 g 0    ibuprofen (ADVIL,MOTRIN) 600 MG tablet Take 1 tablet (600 mg total) by mouth every 6 (six) hours as needed for Pain (Take with food as needed for mild-to-moderate pain). (Patient not taking: Reported on 3/6/2024) 20 tablet 0    traMADoL (ULTRAM) 50 mg tablet Take 1 tablet (50 mg total) by mouth every 6 (six) hours as needed for Pain (As needed for severe 10/10 pain). (Patient not taking: Reported on 3/6/2024) 10 tablet 0     No current facility-administered medications on file prior to visit.       Vital signs:  Ht 5'  "11" (1.803 m)   Wt 61.9 kg (136 lb 7.4 oz)   BMI 19.03 kg/m²     Physical Exam  Constitutional:       Appearance: Normal appearance. He is normal weight.   Abdominal:      General: Abdomen is flat. There is no distension.   Neurological:      Mental Status: He is alert.       I have reviewed associated labs, imaging and notes.     Assessment:  1. Infrequent bowel movements    BM 2-3x/week   Not feeling empty, some straining   Increase in gas  No abd pain, no blood in stool     Plan:     Increase dietary fiber and trial fiber supplement daily  Take 1 capful of miralax daily     F/U as needed    Amy Deal NP  [facility] [place]    "

## 2024-03-06 NOTE — PATIENT INSTRUCTIONS
- start taking daily fiber supplement   - such as benefiber or citrucel     - start taking miralax daily   - 1 capful mixed in about 6-8 ounces of liquid

## 2024-03-21 ENCOUNTER — TELEPHONE (OUTPATIENT)
Dept: GASTROENTEROLOGY | Facility: CLINIC | Age: 24
End: 2024-03-21

## 2024-03-25 ENCOUNTER — HOSPITAL ENCOUNTER (EMERGENCY)
Facility: HOSPITAL | Age: 24
Discharge: HOME | End: 2024-03-25
Attending: EMERGENCY MEDICINE

## 2024-03-25 VITALS
TEMPERATURE: 99 F | HEIGHT: 71 IN | DIASTOLIC BLOOD PRESSURE: 76 MMHG | SYSTOLIC BLOOD PRESSURE: 124 MMHG | OXYGEN SATURATION: 100 % | WEIGHT: 136 LBS | BODY MASS INDEX: 19.04 KG/M2 | HEART RATE: 72 BPM | RESPIRATION RATE: 14 BRPM

## 2024-03-25 DIAGNOSIS — R10.9 ABDOMINAL DISCOMFORT: Primary | ICD-10-CM

## 2024-03-25 DIAGNOSIS — K59.00 CONSTIPATION: ICD-10-CM

## 2024-03-25 PROCEDURE — 99283 EMERGENCY DEPT VISIT LOW MDM: CPT | Mod: 25,ER

## 2024-03-25 RX ORDER — OMEPRAZOLE 40 MG/1
40 CAPSULE, DELAYED RELEASE ORAL DAILY
Qty: 30 CAPSULE | Refills: 0 | Status: SHIPPED | OUTPATIENT
Start: 2024-03-25 | End: 2025-03-25

## 2024-03-25 NOTE — ED PROVIDER NOTES
"Encounter Date: 3/25/2024    SCRIBE #1 NOTE: I, Arely Huynh, am scribing for, and in the presence of,  Galileo Nolan MD. I have scribed the following portions of the note - Other sections scribed: HPI, ROS, PE.       History     Chief Complaint   Patient presents with    Abdominal Discomfort     C/o abdominal discomfort. Pt reports visit to GI doc early March for constipation. Last BM 3/23. Denies any other symptoms, no urinary changes. Pt reports "wanting repeat Xray."     Tarik Barton is a 24 y.o. male, with no PMHx, who presents to the ED complaining of constipation x "a few months". Patient visited GI specialist on 03/06/24 for constipation and X-ray showed he had lot of stool. Patient was prescribed Miralax and advised to eat high-fiber foods. Reports normal appetite and has been drinking a lot of water. Patient reports minimal relief from medication but has only been having bowel movements every 3-4 days. Reports last BM was 3 days ago. Patient denies any difficulty urinating, fluctuating between constipation and diarrhea, or other associated symptoms. Patient denies smoking, EtOH consumption, or recreational drug use.      The history is provided by the patient. No  was used.     Review of patient's allergies indicates:   Allergen Reactions    Advil [ibuprofen]     Aleve [naproxen sodium]      History reviewed. No pertinent past medical history.  History reviewed. No pertinent surgical history.  History reviewed. No pertinent family history.  Social History     Tobacco Use    Smoking status: Never    Smokeless tobacco: Never   Substance Use Topics    Alcohol use: No    Drug use: No     Review of Systems   Constitutional:  Negative for fever.   HENT:  Negative for sore throat.    Eyes:  Negative for visual disturbance.   Respiratory:  Negative for shortness of breath.    Cardiovascular:  Negative for chest pain.   Gastrointestinal:  Positive for constipation. Negative for " abdominal pain, diarrhea, nausea and vomiting.   Genitourinary:  Negative for difficulty urinating and dysuria.   Musculoskeletal:  Negative for back pain.   Skin:  Negative for rash.   Neurological:  Negative for headaches.       Physical Exam     Initial Vitals [03/25/24 0642]   BP Pulse Resp Temp SpO2   136/81 103 20 98.5 °F (36.9 °C) 100 %      MAP       --         Physical Exam    Nursing note and vitals reviewed.  Constitutional: He appears well-developed and well-nourished.   HENT:   Head: Atraumatic.   Eyes: EOM are normal. Pupils are equal, round, and reactive to light.   Neck: Neck supple. No JVD present.   Normal range of motion.  Cardiovascular:  Normal rate, regular rhythm, normal heart sounds and intact distal pulses.     Exam reveals no gallop and no friction rub.       No murmur heard.  Pulmonary/Chest: Effort normal and breath sounds normal.   Abdominal: Abdomen is soft. Bowel sounds are normal. There is no abdominal tenderness.   Genitourinary:    Genitourinary Comments: Exam chaperoned by RHONDA Hawk. On exam, small external hemorrhoid present. No internal hemorrhoids or fecal impaction.      Musculoskeletal:         General: Normal range of motion.      Cervical back: Normal range of motion and neck supple.     Lymphadenopathy:     He has no cervical adenopathy.   Neurological: He is alert and oriented to person, place, and time. He has normal strength.   Skin: Skin is warm and dry.   Psychiatric: He has a normal mood and affect. Thought content normal.         ED Course   Procedures  Labs Reviewed - No data to display         Imaging Results              X-Ray Abdomen Flat And Erect (Final result)  Result time 03/25/24 08:12:51      Final result by Palomo Zimmerman MD (03/25/24 08:12:51)                   Impression:      Nonobstructive bowel gas pattern.      Electronically signed by: Palomo Zimmerman MD  Date:    03/25/2024  Time:    08:12               Narrative:    EXAMINATION:  XR ABDOMEN  FLAT AND ERECT    CLINICAL HISTORY:  Constipation, unspecified.    TECHNIQUE:  Flat and erect frontal views of the abdomen were performed.    COMPARISON:  None.    FINDINGS:  Bowel gas pattern is nonobstructive.  No evidence of pneumoperitoneum.  No large volume fecal burden.  No pathologic calcifications.  Bones are unremarkable.                                       Medications - No data to display  Medical Decision Making  Amount and/or Complexity of Data Reviewed  Labs: ordered. Decision-making details documented in ED Course.  Radiology: ordered. Decision-making details documented in ED Course.    Risk  Prescription drug management.    Patient presents complaining of constipation.  However x-ray does not appear shows significant constipation.  Patient's abdominal symptoms or not well defined.  He states he is eating and drinking well.  There was no abdominal tenderness.  There is no abdominal distention.  There is no abnormal bowel sounds.  Will try PPI.  Refer to GI.        Scribe Attestation:   Scribe #1: I performed the above scribed service and the documentation accurately describes the services I performed. I attest to the accuracy of the note.                             I, Galileo Nolan, personally performed the services described in this documentation. All medical record entries made by the scribe were at my direction and in my presence. I have reviewed the chart and agree that the record reflects my personal performance and is accurate and complete.    Clinical Impression:  Final diagnoses:  [K59.00] Constipation  [R10.9] Abdominal discomfort (Primary)          ED Disposition Condition    Discharge Stable          ED Prescriptions       Medication Sig Dispense Start Date End Date Auth. Provider    omeprazole (PRILOSEC) 40 MG capsule Take 1 capsule (40 mg total) by mouth once daily. 30 capsule 3/25/2024 3/25/2025 Galileo Nolan MD          Follow-up Information       Follow up With Specialties  Details Why Contact Info    Zacarias Ford MD Gastroenterology Schedule an appointment as soon as possible for a visit   [address]  [address]  [facility] [address]  [phone]               Galileo Nolan MD  03/27/24 1016

## 2024-03-25 NOTE — Clinical Note
"Tarik Shaffer" Mick was seen and treated in our emergency department on 3/25/2024.  He may return to work on 03/27/2024.       If you have any questions or concerns, please don't hesitate to call.      Kenn ELLIS    "

## 2024-03-26 ENCOUNTER — HOSPITAL ENCOUNTER (EMERGENCY)
Facility: HOSPITAL | Age: 24
Discharge: HOME | End: 2024-03-26
Attending: EMERGENCY MEDICINE

## 2024-03-26 ENCOUNTER — TELEPHONE (OUTPATIENT)
Dept: GASTROENTEROLOGY | Facility: CLINIC | Age: 24
End: 2024-03-26

## 2024-03-26 VITALS
DIASTOLIC BLOOD PRESSURE: 86 MMHG | HEART RATE: 101 BPM | BODY MASS INDEX: 18.83 KG/M2 | RESPIRATION RATE: 20 BRPM | WEIGHT: 135 LBS | TEMPERATURE: 98 F | OXYGEN SATURATION: 100 % | SYSTOLIC BLOOD PRESSURE: 121 MMHG

## 2024-03-26 DIAGNOSIS — K59.00 CONSTIPATION, UNSPECIFIED CONSTIPATION TYPE: Primary | ICD-10-CM

## 2024-03-26 PROCEDURE — 99281 EMR DPT VST MAYX REQ PHY/QHP: CPT | Mod: ER

## 2024-03-26 NOTE — ED PROVIDER NOTES
SCRIBE #1 NOTE: I, Marisol Soria, am scribing for, and in the presence of,  Ricco Cortes MD.           EM PHYSICIAN NOTE       This patient presents with a complaint of   Chief Complaint   Patient presents with    Abdominal Pain     Pt was seen here yesterday for same issue.   Was dx with constipation   Pt wants a ct scan to make sure he doesn't need surgery   Pt appears very anxious          Source of HPI & ROS: patient    HPI: 25 yo M w/ no PMHx presenting to the ED for persistent constipation for the last few months. Also notes intermittent abdominal discomfort. States he had seen GI earlier this month and was prescribed Miralax for constipation relief. States he has only been taking Miralax once a day along w/ staying hydrated w/ only 1 bottle of water. He states he had a small BM yesterday but his last full BM occurred 6 days ago. He returns today as he continues to experience constipation. He denies any vomiting, fever, other acute complaints at this time.  There is no pain at present.      Review of patient's allergies indicates:   Allergen Reactions    Advil [ibuprofen]     Aleve [naproxen sodium]              Pertinent REVIEW of SYSTEMS    GENERAL/CONSTITUTIONAL: There is not a report of fever   CARDIOVASCULAR: There is not a report of chest pain   RESPIRATORY: There is not a report of cough or SOB  GASTROINTESTINAL: see hpi   HEMATOLOGIC/LYMPHATIC: There is not a report of anticoagulant/antithrombotic use.       The nurse's notes and triage vital signs were reviewed.    PHYSICAL EXAMINATION    ED Triage Vitals [03/26/24 0857]   Enc Vitals Group      /86      Pulse 101      Resp 20      Temp 97.5 °F (36.4 °C)      Temp Source Oral      SpO2 100 %      Weight 135 lb      Height       Head Circumference       Peak Flow       Pain Score       Pain Loc       Pain Edu?       Excl. in GC?      Vital signs and Pulse Ox reviewed in clinical context. Abnormalities noted: None  Body mass index is 18.83  kg/m².  Pt's level of consciousness is Awake and Alert, and the patient is in no distress.  Skin: warm, pink and dry.  Capillary refill is less than 2 seconds.  Mucosa: normal  Head and Neck: no JVD, neck supple  Cardiac exam: RRR I did not appreciate a murmur.  Pulmonary exam: unlabored and clear  Abd Exam: soft nontender   Musculoskeletal: no joint tenderness, deformity or swelling   Neurologic: GCS 15; moving all extremities equally, no facial droop       Medical decision making:   Nurses notes and Vital Signs reviewed.     Problems: Today's visit reveals constipation and anxiety related to constipation which is a/an Acute problem        MDM Components integrated into this visit: Social determinants of health impacting care today: Access to PCP impaired because patient was unable to obtain appointment with PCP in a timely manner    Considerations: My decision to discharge home this patient is based on normal exam and chart review which reveals the patient has had a workup recently.               No orders of the defined types were placed in this encounter.    Medications - No data to display        Diagnoses that have been ruled out:   None   Diagnoses that are still under consideration:   None   Final diagnoses:   Constipation, unspecified constipation type          Disposition:  Discharge with reassurance and plan for keeping a water intake diary      Referral for follow-up  Follow-up Information       Follow up With Specialties Details Why Contact Info    Keep your appointment with your GI doctor                Prescription management:  ED Prescriptions    Increase MiraLax to twice a day               This note was created using Dictation Software.  This program may occasionally misinterpret certain words and phrases.      SCRIBE ATTESTATION NOTE:   I attest that I personally performed the services documented by the scribe and acknowledged and confirm the content of the note.   Nurses notes were  reviewed.  Ricco Beltrán MD  03/26/24 1991

## 2024-03-26 NOTE — TELEPHONE ENCOUNTER
Informed pt that sonia is fully booked for today and he can try 2x a day miralax  for a week or so and give us an update with it. Verbal understanding

## 2024-03-26 NOTE — DISCHARGE INSTRUCTIONS
As we discussed you should keep a diary to keep track of the water you drink.  In addition you may start taking the MiraLax twice a day.

## 2024-03-27 ENCOUNTER — HOSPITAL ENCOUNTER (EMERGENCY)
Facility: HOSPITAL | Age: 24
Discharge: HOME | End: 2024-03-27
Attending: EMERGENCY MEDICINE

## 2024-03-27 VITALS
TEMPERATURE: 98 F | BODY MASS INDEX: 19.47 KG/M2 | RESPIRATION RATE: 19 BRPM | SYSTOLIC BLOOD PRESSURE: 126 MMHG | DIASTOLIC BLOOD PRESSURE: 80 MMHG | HEIGHT: 70 IN | WEIGHT: 136 LBS | HEART RATE: 93 BPM | OXYGEN SATURATION: 97 %

## 2024-03-27 DIAGNOSIS — K59.00 CONSTIPATION, UNSPECIFIED CONSTIPATION TYPE: Primary | ICD-10-CM

## 2024-03-27 PROCEDURE — 99281 EMR DPT VST MAYX REQ PHY/QHP: CPT | Mod: ER

## 2024-03-27 NOTE — ED PROVIDER NOTES
Encounter Date: 3/27/2024    SCRIBE #1 NOTE: I, Beti Bolton, am scribing for, and in the presence of,  Debora Munoz MD. I have scribed the following portions of the note - Other sections scribed: HPI,ROS,PE,MDM.       History     Chief Complaint   Patient presents with    Abdominal Pain     States we ([facility]) called him and told home to come in.  Pt states not getting better since last visit     This is a 24 y.o. male, with no pertinent PMHx, who presents to the ED with complaint of persistent constipation that began for over the past few months. Patient reports associated fatigue. Per chart review, the patient saw GI for the same on 3/6/24 then was seen in the ER on 3/25 and 3/26 for the same. There are no other exacerbating or alleviating factors. Patient notes he has bowel movements but believes it is less than his normal. Patient denies unexpected weight change or other associated symptoms.       The history is provided by the patient. No  was used.     Review of patient's allergies indicates:   Allergen Reactions    Advil [ibuprofen]     Aleve [naproxen sodium]      No past medical history on file.  No past surgical history on file.  No family history on file.  Social History     Tobacco Use    Smoking status: Never    Smokeless tobacco: Never   Substance Use Topics    Alcohol use: No    Drug use: No     Review of Systems   Constitutional:  Positive for fatigue. Negative for activity change, appetite change, chills, fever and unexpected weight change.   HENT:  Negative for congestion, rhinorrhea, sneezing and sore throat.    Respiratory:  Negative for cough, chest tightness, shortness of breath and wheezing.    Cardiovascular:  Negative for chest pain and palpitations.   Gastrointestinal:  Positive for constipation and nausea. Negative for abdominal pain, diarrhea and vomiting.   Skin:  Negative for rash.   Neurological:  Negative for dizziness, weakness, light-headedness and headaches.    All other systems reviewed and are negative.      Physical Exam     Initial Vitals [03/27/24 1622]   BP Pulse Resp Temp SpO2   126/80 93 19 97.7 °F (36.5 °C) 97 %      MAP       --         Physical Exam    Nursing note and vitals reviewed.  Constitutional: He appears well-developed and well-nourished. No distress.   Pacing in room during interview and exam   HENT:   Head: Normocephalic and atraumatic.   Eyes: Conjunctivae are normal.   Neck:   Normal range of motion.  Cardiovascular:  Normal rate and regular rhythm.           No murmur heard.  Pulmonary/Chest: Breath sounds normal. No respiratory distress.   Abdominal: Bowel sounds are normal. He exhibits no distension. There is no abdominal tenderness.   Musculoskeletal:         General: No tenderness or edema. Normal range of motion.      Cervical back: Normal range of motion.     Neurological: He is alert and oriented to person, place, and time.   Skin: Skin is warm and dry. No rash noted.   Psychiatric: His behavior is normal. His mood appears anxious.         ED Course   Procedures  Labs Reviewed - No data to display       Imaging Results    None          Medications - No data to display  Medical Decision Making  24M with no pertinent PMHx, who presents to the ED with complaint of persistent constipation that began for over the past few months. On exam, normal heart rate and rhythm, normal breath sounds, and abdomen is soft and nontender. In shared decision making with the patient, we discussed dietary changes to promote more regular BMs. Pt advised to continue miralax, increase water intake and eat 3 whole fruits and 23 vegetables daily. He was informed that his PCP ordered a CT. He was also advised to continue seeing GI. No emergent imaging indicated at this time.             Scribe Attestation:   Scribe #1: I performed the above scribed service and the documentation accurately describes the services I performed. I attest to the accuracy of the note.                              I, Dr. Debora Munoz, personally performed the services described in this documentation.   All medical record entries made by the scribe were at my direction and in my presence.   I have reviewed the chart and agree that the record is accurate and complete.   Debora Munoz MD.  4:58 PM 03/28/2024     Clinical Impression:  Final diagnoses:  [K59.00] Constipation, unspecified constipation type (Primary)          ED Disposition Condition    Discharge Stable          ED Prescriptions    None       Follow-up Information    None          Debora Munoz MD  03/28/24 0629

## 2024-03-27 NOTE — Clinical Note
"Tarik"Walter Barton was seen and treated in our emergency department on 3/27/2024.  He may return to work on 03/28/2024.       If you have any questions or concerns, please don't hesitate to call.      Debora Munoz MD"

## 2024-03-27 NOTE — DISCHARGE INSTRUCTIONS
Continue miralax as directed. Eat 3 whole fruits and 3 servings of vegetables a day and drink only water. Follow up with GI if you are not better with this treatment.

## 2024-04-23 ENCOUNTER — HOSPITAL ENCOUNTER (EMERGENCY)
Facility: HOSPITAL | Age: 24
Discharge: HOME | End: 2024-04-23
Attending: EMERGENCY MEDICINE

## 2024-04-23 VITALS
WEIGHT: 129 LBS | OXYGEN SATURATION: 98 % | RESPIRATION RATE: 20 BRPM | BODY MASS INDEX: 18.51 KG/M2 | DIASTOLIC BLOOD PRESSURE: 67 MMHG | TEMPERATURE: 98 F | HEART RATE: 87 BPM | SYSTOLIC BLOOD PRESSURE: 115 MMHG

## 2024-04-23 DIAGNOSIS — R31.9 HEMATURIA, UNSPECIFIED TYPE: Primary | ICD-10-CM

## 2024-04-23 DIAGNOSIS — R53.83 FATIGUE: ICD-10-CM

## 2024-04-23 LAB
ALBUMIN SERPL-MCNC: 4.1 G/DL (ref 3.3–5.5)
ALBUMIN SERPL-MCNC: 4.2 G/DL (ref 3.3–5.5)
ALP SERPL-CCNC: 60 U/L (ref 42–141)
ALP SERPL-CCNC: 62 U/L (ref 42–141)
AMPHET+METHAMPHET UR QL: NEGATIVE
BARBITURATES UR QL SCN>200 NG/ML: NEGATIVE
BENZODIAZ UR QL SCN>200 NG/ML: NEGATIVE
BILIRUB SERPL-MCNC: 1.5 MG/DL (ref 0.2–1.6)
BILIRUB SERPL-MCNC: 1.5 MG/DL (ref 0.2–1.6)
BILIRUBIN, POC UA: NEGATIVE
BLOOD, POC UA: ABNORMAL
BUN SERPL-MCNC: 21 MG/DL (ref 7–22)
BZE UR QL SCN: NEGATIVE
CALCIUM SERPL-MCNC: 9.1 MG/DL (ref 8–10.3)
CANNABINOIDS UR QL SCN: NEGATIVE
CHLORIDE SERPL-SCNC: 104 MMOL/L (ref 98–108)
CLARITY, POC UA: CLEAR
COLOR, POC UA: ABNORMAL
CREAT SERPL-MCNC: 1.2 MG/DL (ref 0.6–1.2)
CREAT UR-MCNC: 220.7 MG/DL (ref 23–375)
GLUCOSE SERPL-MCNC: 98 MG/DL (ref 73–118)
GLUCOSE, POC UA: NEGATIVE
HCT, POC: NORMAL
HGB, POC: NORMAL (ref 14–18)
KETONES, POC UA: ABNORMAL
LEUKOCYTE EST, POC UA: NEGATIVE
MCH, POC: NORMAL
MCHC, POC: NORMAL
MCV, POC: NORMAL
METHADONE UR QL SCN>300 NG/ML: NEGATIVE
MPV, POC: NORMAL
NITRITE, POC UA: NEGATIVE
OHS QRS DURATION: 70 MS
OHS QTC CALCULATION: 430 MS
OPIATES UR QL SCN: NEGATIVE
PCP UR QL SCN>25 NG/ML: NEGATIVE
PH UR STRIP: 5.5 [PH]
POC ALT (SGPT): 22 U/L (ref 10–47)
POC ALT (SGPT): 25 U/L (ref 10–47)
POC AMYLASE: 101 U/L (ref 14–97)
POC AST (SGOT): 30 U/L (ref 11–38)
POC AST (SGOT): 34 U/L (ref 11–38)
POC CARDIAC TROPONIN I: 0.01 NG/ML (ref 0–0.08)
POC GGT: 14 U/L (ref 5–65)
POC PLATELET COUNT: NORMAL
POC TCO2: 32 MMOL/L (ref 18–33)
POCT GLUCOSE: 81 MG/DL (ref 70–110)
POTASSIUM BLD-SCNC: 4.5 MMOL/L (ref 3.6–5.1)
PROTEIN, POC UA: NEGATIVE
PROTEIN, POC: 7.9 G/DL (ref 6.4–8.1)
PROTEIN, POC: 8.1 G/DL (ref 6.4–8.1)
RBC, POC: NORMAL
RDW, POC: NORMAL
SAMPLE: NORMAL
SODIUM BLD-SCNC: 146 MMOL/L (ref 128–145)
SPECIFIC GRAVITY, POC UA: >=1.03
TOXICOLOGY INFORMATION: NORMAL
UROBILINOGEN, POC UA: 0.2 E.U./DL
WBC, POC: NORMAL

## 2024-04-23 PROCEDURE — 25000003 PHARM REV CODE 250: Mod: ER | Performed by: EMERGENCY MEDICINE

## 2024-04-23 PROCEDURE — 80053 COMPREHEN METABOLIC PANEL: CPT | Mod: ER

## 2024-04-23 PROCEDURE — 82040 ASSAY OF SERUM ALBUMIN: CPT | Mod: 59,ER

## 2024-04-23 PROCEDURE — 80307 DRUG TEST PRSMV CHEM ANLYZR: CPT | Performed by: EMERGENCY MEDICINE

## 2024-04-23 PROCEDURE — 93005 ELECTROCARDIOGRAM TRACING: CPT | Mod: ER

## 2024-04-23 PROCEDURE — 93010 ELECTROCARDIOGRAM REPORT: CPT | Mod: ,,, | Performed by: INTERNAL MEDICINE

## 2024-04-23 PROCEDURE — 85025 COMPLETE CBC W/AUTO DIFF WBC: CPT | Mod: ER

## 2024-04-23 PROCEDURE — 96361 HYDRATE IV INFUSION ADD-ON: CPT | Mod: ER

## 2024-04-23 PROCEDURE — 99284 EMERGENCY DEPT VISIT MOD MDM: CPT | Mod: 25,ER

## 2024-04-23 PROCEDURE — 96374 THER/PROPH/DIAG INJ IV PUSH: CPT | Mod: ER

## 2024-04-23 PROCEDURE — 82150 ASSAY OF AMYLASE: CPT | Mod: ER

## 2024-04-23 PROCEDURE — 84484 ASSAY OF TROPONIN QUANT: CPT | Mod: ER

## 2024-04-23 PROCEDURE — 82962 GLUCOSE BLOOD TEST: CPT | Mod: ER

## 2024-04-23 RX ORDER — FAMOTIDINE 10 MG/ML
40 INJECTION INTRAVENOUS
Status: COMPLETED | OUTPATIENT
Start: 2024-04-23 | End: 2024-04-23

## 2024-04-23 RX ORDER — MULTIVITAMIN
1 TABLET ORAL DAILY
Qty: 99 TABLET | Refills: 0 | Status: SHIPPED | OUTPATIENT
Start: 2024-04-23 | End: 2024-07-31

## 2024-04-23 RX ADMIN — FAMOTIDINE 40 MG: 10 INJECTION, SOLUTION INTRAVENOUS at 09:04

## 2024-04-23 RX ADMIN — SODIUM CHLORIDE 1000 ML: 9 INJECTION, SOLUTION INTRAVENOUS at 09:04

## 2024-04-23 NOTE — Clinical Note
"Tarik "Tarik"Mick was seen and treated in our emergency department on 4/23/2024.  He may return to work on 04/25/2024.       If you have any questions or concerns, please don't hesitate to call.      Roro Vasquez, DO"

## 2024-04-23 NOTE — ED PROVIDER NOTES
Encounter Date: 4/23/2024    SCRIBE #1 NOTE: I, Marisol Estella, am scribing for, and in the presence of,  Roro Vasquez DO.       History     Chief Complaint   Patient presents with    Fatigue     Pt reports for the past couple of weeks he has been feeling tired and weak and just not himself  Pt wanting a blood test workup for diabetes since it runs in his family        23 yo M w/ no PMHx presenting to the ED for fatigue and generalized weakness x1mo. States he came in for evaluation today for point of care glucose test as he has family hx of diabetes. No other exacerbating or alleviating factors. Denies chest pain, dyspnea, NVD, dysuria, or other associated symptoms.     The history is provided by the patient.     Review of patient's allergies indicates:   Allergen Reactions    Advil [ibuprofen]     Aleve [naproxen sodium]      No past medical history on file.  No past surgical history on file.  No family history on file.  Social History     Tobacco Use    Smoking status: Never    Smokeless tobacco: Never   Substance Use Topics    Alcohol use: No    Drug use: No     Review of Systems   Constitutional:  Positive for fatigue. Negative for chills and fever.   HENT:  Negative for congestion, rhinorrhea and sore throat.    Eyes:  Negative for visual disturbance.   Respiratory:  Negative for cough and shortness of breath.    Cardiovascular:  Negative for chest pain.   Gastrointestinal:  Negative for abdominal pain, diarrhea, nausea and vomiting.   Endocrine: Negative.    Genitourinary:  Negative for dysuria, frequency and hematuria.   Musculoskeletal:  Negative for back pain.   Skin:  Negative for rash.   Neurological:  Positive for weakness. Negative for dizziness and headaches.       Physical Exam     Initial Vitals [04/23/24 0759]   BP Pulse Resp Temp SpO2   111/64 91 20 97.6 °F (36.4 °C) 98 %      MAP       --         Physical Exam    Nursing note and vitals reviewed.  Constitutional: He appears well-developed and  well-nourished. He is not diaphoretic. No distress.   HENT:   Head: Normocephalic and atraumatic.   Right Ear: External ear normal.   Left Ear: External ear normal.   Nose: Nose normal.   Mouth/Throat: Oropharynx is clear and moist.   Eyes: Conjunctivae and EOM are normal. Pupils are equal, round, and reactive to light.   Neck: Neck supple.   Normal range of motion.  Cardiovascular:  Normal rate, regular rhythm, normal heart sounds and intact distal pulses.           Pulmonary/Chest: Breath sounds normal. No stridor. No respiratory distress. He has no wheezes.   Abdominal: Abdomen is soft. Bowel sounds are normal.   Musculoskeletal:         General: No edema. Normal range of motion.      Cervical back: Normal range of motion and neck supple.     Neurological: He is alert and oriented to person, place, and time.   Skin: Skin is warm and dry.   Psychiatric: He has a normal mood and affect.         ED Course   Procedures  Labs Reviewed   POCT URINALYSIS W/O SCOPE - Abnormal; Notable for the following components:       Result Value    Ketones, UA 1+ (*)     Spec Grav UA >=1.030 (*)     Blood, UA Trace-intact (*)     All other components within normal limits   POCT CMP - Abnormal; Notable for the following components:    POC Sodium 146 (*)     All other components within normal limits   POCT LIVER PANEL - Abnormal; Notable for the following components:    Amylase,  (*)     All other components within normal limits   DRUG SCREEN PANEL, URINE EMERGENCY    Narrative:     Specimen Source->Urine   TROPONIN ISTAT   POCT CBC   POCT GLUCOSE   POCT CMP   POCT TROPONIN   POCT LIVER PANEL     EKG Readings: (Independently Interpreted)   No stemi  NSR 74   Rightward axis   Abnormal EKG   Qtc 430  No prior EKG for comparison      ECG Results              EKG 12-lead (Final result)        Collection Time Result Time QRS Duration [facility] QTC Calculation    04/23/24 09:04:23 04/23/24 10:21:13 70 430                     Final result by  Interface, Lab In [facility] (04/23/24 10:21:20)                   Narrative:    Test Reason : R53.83,    Vent. Rate : 074 BPM     Atrial Rate : 074 BPM     P-R Int : 202 ms          QRS Dur : 070 ms      QT Int : 388 ms       P-R-T Axes : 085 101 074 degrees     QTc Int : 430 ms    Normal sinus rhythm  Rightward axis  Borderline Abnormal ECG  No previous ECGs available  Confirmed by Fili Sotelo MD ([id number]) on 4/23/2024 10:21:11 AM    Referred By: CHANCE   SELF           Confirmed By:Fili Sotelo MD                                  Imaging Results              X-Ray Chest PA And Lateral (Final result)  Result time 04/23/24 09:17:24      Final result by Bret Velasquez MD (04/23/24 09:17:24)                   Impression:      No evidence of acute cardiopulmonary disease.    Rightward deviation of the trachea the level of the neck.  This is presumably related to the position of the nonvisualized head, but clinical correlation advised.      Electronically signed by: Bret Velasquez MD  Date:    04/23/2024  Time:    09:17               Narrative:    EXAMINATION:  XR CHEST PA AND LATERAL    CLINICAL HISTORY:  Fatigue;    TECHNIQUE:  PA and lateral views of the chest were performed.    COMPARISON:  None    FINDINGS:  Rightward deviation of the trachea at the level of the neck.    Multiple overlying cardiac monitoring leads. The cardiomediastinal silhouette is normal in size and midline. Pulmonary vascularity appears within normal limits.    The lungs appear clear without confluent pulmonary parenchymal opacity. No pleural fluid.    Osseous structures appear intact.                                       Medications   sodium chloride 0.9% bolus 1,000 mL 1,000 mL (0 mLs Intravenous Stopped 4/23/24 1026)   famotidine (PF) injection 40 mg (40 mg Intravenous Given 4/23/24 0911)     Medical Decision Making  Amount and/or Complexity of Data Reviewed  Labs: ordered.  Radiology: ordered.    Risk  OTC drugs.  Prescription  drug management.        Medical Decision Making:    This is an evaluation of a 24 y.o. male that presents to the Emergency Department for   Chief Complaint   Patient presents with    Fatigue     Pt reports for the past couple of weeks he has been feeling tired and weak and just not himself  Pt wanting a blood test workup for diabetes since it runs in his family          Independent historian: (parent/ EMS/ spouse/ etc)     The patient is a non-toxic and well appearing patient. On physical exam, patient appears well hydrated with moist mucus membranes. Breath sounds are clear and equal bilaterally with no adventitious breath sounds, tachypnea or respiratory distress. Regular rate and rhythm. No murmurs. Abdomen soft and non tender. Patient is tolerating PO without difficulty. Physical exam otherwise as above.     I have reviewed vital signs and nursing notes.   Vital Signs Are Reassuring.     Based on the patient's symptoms, I am considering and evaluating for the following differential diagnoses: hyperglycemia, anemia, cardiac arrhythmia, electrolyte imbalance    ED Course:Treatment in the ED included Physical Exam and medications given in ED  Medications   sodium chloride 0.9% bolus 1,000 mL 1,000 mL (0 mLs Intravenous Stopped 4/23/24 1026)   famotidine (PF) injection 40 mg (40 mg Intravenous Given 4/23/24 0911)   .   Patient reports feeling better after treatment in the ER.   Vital signs reviewed  Nurse's notes reviewed  External Data/Documents Reviewed: Previous medical records and vital signs reviewed, see HPI and Physical exam.   Labs: ordered and reviewed.  Glucose normal at 98.  Radiology: ordered as indicated and reviewed.  No PTX    Risk  Diagnosis or treatment significantly limited by the following social determinants of health: Body mass index is 18.51 kg/m².     In shared decision making with the patient, we discussed treatment, prescriptions, labs, and imaging results.    Discharge home with   ED  Prescriptions       Medication Sig Dispense Start Date End Date Auth. Provider    multivitamin (THERAGRAN) per tablet Take 1 tablet by mouth once daily. 99 tablet 4/23/2024 7/31/2024 Roro Vasquez DO          Fill and take prescriptions as directed.  Return to the ED if symptoms worsen or do not resolve.   Answered questions and discussed discharge plan.    Patient feels better and is ready for discharge.  Follow up with PCP/specialist in 1 day    The following labs and imaging were reviewed:      Admission on 04/23/2024, Discharged on 04/23/2024   Component Date Value Ref Range Status    Glucose, UA 04/23/2024 Negative   Final    Bilirubin, UA 04/23/2024 Negative   Final    Ketones, UA 04/23/2024 1+ (A)   Final    Spec Grav UA 04/23/2024 >=1.030 (>)   Final    Blood, UA 04/23/2024 Trace-intact (A)   Final    PH, UA 04/23/2024 5.5   Final    Protein, UA 04/23/2024 Negative   Final    Urobilinogen, UA 04/23/2024 0.2  E.U./dL Final    Nitrite, UA 04/23/2024 Negative   Final    Leukocytes, UA 04/23/2024 Negative   Final    Color, UA 04/23/2024 Dark yellow   Final    Clarity, UA 04/23/2024 Clear   Final    POCT Glucose 04/23/2024 81  70 - 110 mg/dL Final    QRS Duration 04/23/2024 70  ms Final    OHS QTC Calculation 04/23/2024 430  ms Final    Benzodiazepines 04/23/2024 Negative  Negative Final    Methadone metabolites 04/23/2024 Negative  Negative Final    Cocaine (Metab.) 04/23/2024 Negative  Negative Final    Opiate Scrn, Ur 04/23/2024 Negative  Negative Final    Barbiturate Screen, Ur 04/23/2024 Negative  Negative Final    Amphetamine Screen, Ur 04/23/2024 Negative  Negative Final    THC 04/23/2024 Negative  Negative Final    Phencyclidine 04/23/2024 Negative  Negative Final    Creatinine, Urine 04/23/2024 220.7  23.0 - 375.0 mg/dL Final    Toxicology Information 04/23/2024 SEE COMMENT   Final    Comment: This screen includes the following classes of drugs at the listed   cut-off:    Benzodiazepines 200  ng/ml  Methadone 300 ng/ml  Cocaine metabolite 300 ng/ml  Opiates 300 ng/ml  Barbiturates 200 ng/ml  Amphetamines 1000 ng/ml  Marijuana metabs (THC) 50 ng/ml  Phencyclidine (PCP) 25 ng/ml    This is a screening test. If results do not correlate with clinical   presentation, then a confirmatory send out test is advised.     This report is intended for use in clinical monitoring and management   of   patients. It is not intended for use in employment related drug   testing.      Albumin, POC 04/23/2024 4.1  3.3 - 5.5 g/dL Final    Alkaline Phosphatase, POC 04/23/2024 62  42 - 141 U/L Final    ALT (SGPT), POC 04/23/2024 25  10 - 47 U/L Final    AST (SGOT), POC 04/23/2024 34  11 - 38 U/L Final    POC BUN 04/23/2024 21  7 - 22 mg/dL Final    Calcium, POC 04/23/2024 9.1  8.0 - 10.3 mg/dL Final    POC Chloride 04/23/2024 104  98 - 108 mmol/L Final    POC Creatinine 04/23/2024 1.2  0.6 - 1.2 mg/dL Final    POC Glucose 04/23/2024 98  73 - 118 mg/dL Final    POC Potassium 04/23/2024 4.5  3.6 - 5.1 mmol/L Final    POC Sodium 04/23/2024 146 (H)  128 - 145 mmol/L Final    Bilirubin, POC 04/23/2024 1.5  0.2 - 1.6 mg/dL Final    POC TCO2 04/23/2024 32  18 - 33 mmol/L Final    Protein, POC 04/23/2024 7.9  6.4 - 8.1 g/dL Final    Albumin, POC 04/23/2024 4.2  3.3 - 5.5 g/dL Final    Alkaline Phosphatase, POC 04/23/2024 60  42 - 141 U/L Final    ALT (SGPT), POC 04/23/2024 22  10 - 47 U/L Final    Amylase, POC 04/23/2024 101 (H)  14 - 97 U/L Final    AST (SGOT), POC 04/23/2024 30  11 - 38 U/L Final    POC GGT 04/23/2024 14  5 - 65 U/L Final    Bilirubin, POC 04/23/2024 1.5  0.2 - 1.6 mg/dL Final    Protein, POC 04/23/2024 8.1  6.4 - 8.1 g/dL Final    POC Cardiac Troponin I 04/23/2024 0.01  0.00 - 0.08 ng/mL Final    Sample 04/23/2024 unknown   Final    Comment: A single negative troponin is insufficient to rule out myocardial infarction.  The use of a serial sampling protocol is recommended practice. Correlate results with reference  intervals established for methodology used. Point of care and core laboratory   troponin results are not interchangeable.          Imaging Results              X-Ray Chest PA And Lateral (Final result)  Result time 04/23/24 09:17:24      Final result by Bret Velasquez MD (04/23/24 09:17:24)                   Impression:      No evidence of acute cardiopulmonary disease.    Rightward deviation of the trachea the level of the neck.  This is presumably related to the position of the nonvisualized head, but clinical correlation advised.      Electronically signed by: Bret Velasquez MD  Date:    04/23/2024  Time:    09:17               Narrative:    EXAMINATION:  XR CHEST PA AND LATERAL    CLINICAL HISTORY:  Fatigue;    TECHNIQUE:  PA and lateral views of the chest were performed.    COMPARISON:  None    FINDINGS:  Rightward deviation of the trachea at the level of the neck.    Multiple overlying cardiac monitoring leads. The cardiomediastinal silhouette is normal in size and midline. Pulmonary vascularity appears within normal limits.    The lungs appear clear without confluent pulmonary parenchymal opacity. No pleural fluid.    Osseous structures appear intact.                                                  Scribe Attestation:   Scribe #1: I performed the above scribed service and the documentation accurately describes the services I performed. I attest to the accuracy of the note.                            I, Dr. Roro Vasquez, personally performed the services described in this documentation. This document was produced by a scribe under my direction and in my presence. All medical record entries made by the scribe were at my direction and in my presence.  I have reviewed the chart and agree that the record reflects my personal performance and is accurate and complete. Roro Vasquez, .     04/24/2024 9:10 AM      Clinical Impression:  Final diagnoses:  [R53.83] Fatigue  [R31.9] Hematuria, unspecified  type (Primary)          ED Disposition Condition    Discharge Stable          ED Prescriptions       Medication Sig Dispense Start Date End Date Auth. Provider    multivitamin (THERAGRAN) per tablet Take 1 tablet by mouth once daily. 99 tablet 4/23/2024 7/31/2024 Roro Vasquez DO          Follow-up Information       Follow up With Specialties Details Why Contact Info    Lalo Hare MD Family Medicine Schedule an appointment as soon as possible for a visit in 1 day  [address] [facility] [address] [phone]      [facility] - Emergency Dept Emergency Medicine Go to  Please go to [facility] emergency department if symptoms worsen [address]  [facility] [place] [address] [phone]             Roro Vasquez DO  04/24/24 0933

## 2024-04-23 NOTE — DISCHARGE INSTRUCTIONS
Your hemoglobin is within normal limits at 15.1 and hematocrit is normal at 45.2.  Your glucose is normal today at 98.  There is a trace amount of blood in your urine.  This is not normal.  Please follow up with primary care physician for further evaluation.  Please sign up for and monitor all results on [facility] patient portal.    Please return to the ED for any new, concerning symptoms, or worsening symptoms.    Please follow-up with your primary care provider within 1 day.  An ER visit can not replace the evaluation by your primary care physician.    In the emergency department it is our goal to rule out life-threatening conditions and make sure that you are safe to be discharged home.    Many medical conditions are difficult to diagnose and may be impossible to diagnosed during a single ER visit.  Many health conditions start with nonspecific symptoms and can only be diagnosed on follow-up visits with your primary care physician or specialist.    Please be aware that all medical conditions can change and we can not predict how you will be feeling tomorrow or the next day.   If you have any worsening or new symptoms you should not hesitate to return to the emergency department for re-evaluation.  Be sure to follow up with your primary care physician for recheck and review any labs or imaging that were performed today.  It is very common for us to identify non emergent incidental findings on labs and imaging which must be followed up with your primary care physician.   If you do not have a primary care physician, you may contact the 1 listed on your discharge paperwork or you can also call the [facility] clinic appointment desk at [phone] schedule an appointment.

## 2024-07-17 ENCOUNTER — OFFICE VISIT (OUTPATIENT)
Dept: GASTROENTEROLOGY | Facility: CLINIC | Age: 24
End: 2024-07-17

## 2024-07-17 VITALS — WEIGHT: 133.38 LBS | HEIGHT: 70 IN | BODY MASS INDEX: 19.09 KG/M2

## 2024-07-17 DIAGNOSIS — K59.04 CHRONIC IDIOPATHIC CONSTIPATION: Primary | ICD-10-CM

## 2024-07-17 PROCEDURE — 99213 OFFICE O/P EST LOW 20 MIN: CPT | Mod: PBBFAC,PN

## 2024-07-17 PROCEDURE — 99214 OFFICE O/P EST MOD 30 MIN: CPT | Mod: S$PBB,,,

## 2024-07-17 PROCEDURE — 99999 PR PBB SHADOW E&M-EST. PATIENT-LVL III: CPT | Mod: PBBFAC,,,

## 2024-07-17 NOTE — PROGRESS NOTES
GASTROENTEROLOGY CLINIC NOTE    Reason for visit: The encounter diagnosis was Chronic idiopathic constipation.  Referring provider/PCP: No, Primary Doctor    HPI:  Tarik Barton is a 24 y.o. male here today for f/u. Started miralax and fiber after last visit, he took for awhile but eventually stopped. Completed f/u xray after taking miralax daily- still with moderate stool burden. He reports now going back to having 2-3 BM/week, not feeling empty. He feels more tired than usual. He had CT completed at outside facility- reports normal results.     Initial Visit in March:  Tarik Barton is a 24 y.o. male here today for infrequent BM's. He presented to the ER in October for concern of rib fracture, xray negative for fracture but stool burden present. He reports increasing water intake with some improvement. Has BM's about 2-3x/week, doesn't feel he is emptying with BM's. Has some straining and increase in gas. Does note diarrhea/loose stools if nervous. He denies abd pain, no blood in stool. No known GI FH.     Prior Endoscopy:  EGD:  Colon:    (Portions of this note were dictated using voice recognition software and may contain dictation related errors in spelling/grammar/syntax not found on text review)    Review of Systems   Gastrointestinal:  Positive for constipation. Negative for blood in stool.     Past Medical History: has no past medical history on file.    Past Surgical History: has no past surgical history on file.    Home medications:   Current Outpatient Medications on File Prior to Visit   Medication Sig Dispense Refill    acetaminophen (TYLENOL) 500 MG tablet Take 2 tablets (1,000 mg total) by mouth every 6 (six) hours as needed for Pain. 30 tablet 0    ibuprofen (ADVIL,MOTRIN) 600 MG tablet Take 1 tablet (600 mg total) by mouth every 6 (six) hours as needed for Pain (Take with food as needed for mild-to-moderate pain). 20 tablet 0    multivitamin (THERAGRAN) per tablet Take 1 tablet  "by mouth once daily. 99 tablet 0    omeprazole (PRILOSEC) 40 MG capsule Take 1 capsule (40 mg total) by mouth once daily. 30 capsule 0    polyethylene glycol (GLYCOLAX) 17 gram/dose powder Take 17 g by mouth daily as needed for Constipation. 116 g 0    traMADoL (ULTRAM) 50 mg tablet Take 1 tablet (50 mg total) by mouth every 6 (six) hours as needed for Pain (As needed for severe 10/10 pain). 10 tablet 0     No current facility-administered medications on file prior to visit.       Vital signs:  Ht 5' 10" (1.778 m)   Wt 60.5 kg (133 lb 6.1 oz)   BMI 19.14 kg/m²     Physical Exam  Constitutional:       Appearance: Normal appearance. He is normal weight.   Abdominal:      General: Abdomen is flat. There is no distension.   Neurological:      Mental Status: He is alert.     I have reviewed associated labs, imaging and notes.     Assessment:  1. Chronic idiopathic constipation    CIC   Little/no improvement with miralax   Xray completed with daily miralax- still mod stool burden  2-3 BM/week, not emptying  CT completed at outside facility- normal per pt report    Plan:  Orders Placed This Encounter    TSH    linaCLOtide (LINZESS) 145 mcg Cap capsule     Check blood work    Start taking linzess daily   Increase/decrease as needed    Upload CT scan to chart    Amy Deal NP  [facility]  [place]    "

## 2024-07-18 ENCOUNTER — PATIENT MESSAGE (OUTPATIENT)
Dept: GASTROENTEROLOGY | Facility: CLINIC | Age: 24
End: 2024-07-18

## 2024-09-13 ENCOUNTER — PATIENT MESSAGE (OUTPATIENT)
Dept: GASTROENTEROLOGY | Facility: CLINIC | Age: 24
End: 2024-09-13

## 2024-09-18 DIAGNOSIS — K59.04 CHRONIC IDIOPATHIC CONSTIPATION: Primary | ICD-10-CM

## 2024-09-23 ENCOUNTER — TELEPHONE (OUTPATIENT)
Dept: ENDOSCOPY | Facility: HOSPITAL | Age: 24
End: 2024-09-23

## 2024-09-23 NOTE — TELEPHONE ENCOUNTER
Spoke to pt for pre-call to confirm scheduled Colonoscopy and patient verbalized understanding of the following:       Date of Procedure (s)  verified 9/27/24  Arrival Time 8:10 AM verified.  Location of Procedure(s) [place] [address]nd Floor verified.  NPO status reinforced. Ok to continue clear liquids up until 4 hours prior to the Endoscopy procedure.   Denies blood thinners and GLP-1s.  Pt confirmed receipt of prep instructions and Rx prep (if applicable).  [facility]  Pt confirmed ride home after procedure if procedure requires anesthesia.   Pre-call screening questionnaire reviewed and completed with patient.   Appointment details are tentative, including check-in time.  If the patient begins taking any blood thinning medications, injectable weight loss/diabetes medications (other than insulin), or Adipex (phentermine) patient was instructed to contact the endoscopy scheduling department as soon as possible.  Patient was advised to call the endoscopy scheduling department if any questions or concerns arise.       [facility] Endoscopy Scheduling Department

## 2024-09-27 ENCOUNTER — ANESTHESIA EVENT (OUTPATIENT)
Dept: ENDOSCOPY | Facility: HOSPITAL | Age: 24
End: 2024-09-27

## 2024-09-27 ENCOUNTER — HOSPITAL ENCOUNTER (OUTPATIENT)
Facility: HOSPITAL | Age: 24
Discharge: HOME | End: 2024-09-27
Attending: INTERNAL MEDICINE | Admitting: INTERNAL MEDICINE

## 2024-09-27 ENCOUNTER — ANESTHESIA (OUTPATIENT)
Dept: ENDOSCOPY | Facility: HOSPITAL | Age: 24
End: 2024-09-27

## 2024-09-27 VITALS
HEIGHT: 70 IN | SYSTOLIC BLOOD PRESSURE: 103 MMHG | DIASTOLIC BLOOD PRESSURE: 55 MMHG | RESPIRATION RATE: 13 BRPM | WEIGHT: 130 LBS | TEMPERATURE: 99 F | HEART RATE: 80 BPM | BODY MASS INDEX: 18.61 KG/M2 | OXYGEN SATURATION: 100 %

## 2024-09-27 PROCEDURE — 37000009 HC ANESTHESIA EA ADD 15 MINS: Performed by: INTERNAL MEDICINE

## 2024-09-27 PROCEDURE — 45378 DIAGNOSTIC COLONOSCOPY: CPT | Mod: 22,,, | Performed by: INTERNAL MEDICINE

## 2024-09-27 PROCEDURE — 45378 DIAGNOSTIC COLONOSCOPY: CPT | Performed by: INTERNAL MEDICINE

## 2024-09-27 PROCEDURE — 25000003 PHARM REV CODE 250: Performed by: NURSE ANESTHETIST, CERTIFIED REGISTERED

## 2024-09-27 PROCEDURE — 63600175 PHARM REV CODE 636 W HCPCS: Performed by: NURSE ANESTHETIST, CERTIFIED REGISTERED

## 2024-09-27 PROCEDURE — 37000008 HC ANESTHESIA 1ST 15 MINUTES: Performed by: INTERNAL MEDICINE

## 2024-09-27 RX ORDER — LIDOCAINE HYDROCHLORIDE 20 MG/ML
INJECTION INTRAVENOUS
Status: DISCONTINUED | OUTPATIENT
Start: 2024-09-27 | End: 2024-09-27

## 2024-09-27 RX ORDER — PROPOFOL 10 MG/ML
VIAL (ML) INTRAVENOUS
Status: DISCONTINUED | OUTPATIENT
Start: 2024-09-27 | End: 2024-09-27

## 2024-09-27 RX ORDER — PROPOFOL 10 MG/ML
VIAL (ML) INTRAVENOUS CONTINUOUS PRN
Status: DISCONTINUED | OUTPATIENT
Start: 2024-09-27 | End: 2024-09-27

## 2024-09-27 RX ADMIN — PROPOFOL 225 MCG/KG/MIN: 10 INJECTION, EMULSION INTRAVENOUS at 09:09

## 2024-09-27 RX ADMIN — Medication 50 MG: at 09:09

## 2024-09-27 RX ADMIN — SODIUM CHLORIDE: 0.9 INJECTION, SOLUTION INTRAVENOUS at 08:09

## 2024-09-27 RX ADMIN — LIDOCAINE HYDROCHLORIDE 75 MG: 20 INJECTION, SOLUTION INTRAVENOUS at 09:09

## 2024-09-27 RX ADMIN — Medication 100 MG: at 09:09

## 2024-09-27 NOTE — ANESTHESIA PREPROCEDURE EVALUATION
"[facility]  Anesthesia Pre-Operative Evaluation         Patient Name: Tarik Barton  YOB: 2000  MRN: [id number]    SUBJECTIVE:     Pre-operative evaluation for Procedure(s) (LRB):  COLONOSCOPY (N/A)     09/27/2024    Tarik Barton is a 24 y.o. male w/ a significant PMHx of constipation.  Patient now presents for the above procedure(s).    TTE:   none documented.     There is no problem list on file for this patient.      Review of patient's allergies indicates:   Allergen Reactions    Advil [ibuprofen]     Aleve [naproxen sodium]        Current Inpatient Medications:      No current facility-administered medications on file prior to encounter.     Current Outpatient Medications on File Prior to Encounter   Medication Sig Dispense Refill    acetaminophen (TYLENOL) 500 MG tablet Take 2 tablets (1,000 mg total) by mouth every 6 (six) hours as needed for Pain. 30 tablet 0    ibuprofen (ADVIL,MOTRIN) 600 MG tablet Take 1 tablet (600 mg total) by mouth every 6 (six) hours as needed for Pain (Take with food as needed for mild-to-moderate pain). 20 tablet 0    linaCLOtide (LINZESS) 145 mcg Cap capsule Take 1 capsule (145 mcg total) by mouth before breakfast. 30 capsule 11    omeprazole (PRILOSEC) 40 MG capsule Take 1 capsule (40 mg total) by mouth once daily. 30 capsule 0    polyethylene glycol (GLYCOLAX) 17 gram/dose powder Take 17 g by mouth daily as needed for Constipation. 116 g 0       History reviewed. No pertinent surgical history.    OBJECTIVE:     Vital Signs Range (Last 24H):         Significant Labs:  No results found for: "WBC", "HGB", "HCT", "PLT", "CHOL", "TRIG", "HDL", "LDLDIRECT", "ALT", "AST", "NA", "K", "CL", "CREATININE", "BUN", "CO2", "TSH", "PSA", "INR", "GLUF", "HGBA1C", "MICROALBUR"    Diagnostic Studies: No relevant studies.    EKG:   Results for orders placed or performed during the hospital encounter of 04/23/24   EKG 12-lead    Collection Time: " 04/23/24  9:04 AM   Result Value Ref Range    QRS Duration 70 ms    [facility] QTC Calculation 430 ms    Narrative    Test Reason : R53.83,    Vent. Rate : 074 BPM     Atrial Rate : 074 BPM     P-R Int : 202 ms          QRS Dur : 070 ms      QT Int : 388 ms       P-R-T Axes : 085 101 074 degrees     QTc Int : 430 ms    Normal sinus rhythm  Rightward axis  Borderline Abnormal ECG  No previous ECGs available  Confirmed by Fili Sotelo MD ([id number]) on 4/23/2024 10:21:11 AM    Referred By: CHANCE   SELF           Confirmed By:Fili Sotelo MD       ASSESSMENT/PLAN:           Pre-op Assessment    I have reviewed the Patient Summary Reports.     I have reviewed the Nursing Notes. I have reviewed the NPO Status.   I have reviewed the Medications.     Review of Systems  Anesthesia Hx:  No problems with previous Anesthesia             Denies Family Hx of Anesthesia complications.    Denies Personal Hx of Anesthesia complications.                    Hematology/Oncology:       -- Denies Anemia:                                  EENT/Dental:  EENT/Dental Normal           Cardiovascular:      Denies Hypertension.    Denies CAD.    Denies CABG/stent.  Denies Dysrhythmias.   Denies Angina.                                  Pulmonary:    Denies COPD.  Denies Asthma.   Denies Shortness of breath.   Denies Sleep Apnea.                Renal/:  Renal/ Normal                 Hepatic/GI:  Hepatic/GI Normal                 Neurological:  Denies TIA.  Denies CVA.                                    Endocrine:  Denies Diabetes.               Physical Exam  General: Cooperative, Alert and Oriented    Airway:  Mallampati: I   Mouth Opening: Normal  TM Distance: Normal  Tongue: Normal  Neck ROM: Normal ROM    Dental:  Intact        Anesthesia Plan  Type of Anesthesia, risks & benefits discussed:    Anesthesia Type: Gen Natural Airway  Intra-op Monitoring Plan: Standard ASA Monitors  Post Op Pain Control Plan: multimodal analgesia  Induction:   IV  Informed Consent: Informed consent signed with the Patient and all parties understand the risks and agree with anesthesia plan.  All questions answered.   ASA Score: 1  Day of Surgery Review of History & Physical: H&P Update referred to the surgeon/provider.    Ready For Surgery From Anesthesia Perspective.     .

## 2024-09-27 NOTE — PROVATION PATIENT INSTRUCTIONS
Discharge Summary/Instructions after an Endoscopic Procedure  Patient Name: Tarik Barton  Patient MRN: [id number]  Patient YOB: 2000 Friday, September 27, 2024  Paco Ramachandran MD  Dear patient,  As a result of recent federal legislation (The Federal Cures Act), you may   receive lab or pathology results from your procedure in your [facility]   account before your physician is able to contact you. Your physician or   their representative will relay the results to you with their   recommendations at their soonest availability.  Thank you,  Your health is very important to us during the Covid Crisis. Following your   procedure today, you will receive a daily text for 2 weeks asking about   signs or symptoms of Covid 19.  Please respond to this text when you   receive it so we can follow up and keep you as safe as possible.   RESTRICTIONS:  During your procedure today, you received medications for sedation.  These   medications may affect your judgment, balance and coordination.  Therefore,   for 24 hours, you have the following restrictions:   - DO NOT drive a car, operate machinery, make legal/financial decisions,   sign important papers or drink alcohol.    ACTIVITY:  Today: no heavy lifting, straining or running due to procedural   sedation/anesthesia.  The following day: return to full activity including work.  DIET:  Eat and drink normally unless instructed otherwise.     TREATMENT FOR COMMON SIDE EFFECTS:  - Mild abdominal pain, nausea, belching, bloating or excessive gas:  rest,   eat lightly and use a heating pad.  - Sore Throat: treat with throat lozenges and/or gargle with warm salt   water.  - Because air was used during the procedure, expelling large amounts of air   from your rectum or belching is normal.  - If a bowel prep was taken, you may not have a bowel movement for 1-3 days.    This is normal.  SYMPTOMS TO WATCH FOR AND REPORT TO YOUR PHYSICIAN:  1. Abdominal pain or bloating,  other than gas cramps.  2. Chest pain.  3. Back pain.  4. Signs of infection such as: chills or fever occurring within 24 hours   after the procedure.  5. Rectal bleeding, which would show as bright red, maroon, or black stools.   (A tablespoon of blood from the rectum is not serious, especially if   hemorrhoids are present.)  6. Vomiting.  7. Weakness or dizziness.  GO DIRECTLY TO THE NEAREST EMERGENCY ROOM IF YOU HAVE ANY OF THE FOLLOWING:      Difficulty breathing              Chills and/or fever over 101 F   Persistent vomiting and/or vomiting blood   Severe abdominal pain   Severe chest pain   Black, tarry stools   Bleeding- more than one tablespoon   Any other symptom or condition that you feel may need urgent attention  Your doctor recommends these additional instructions:  If any biopsies were taken, your doctors clinic will contact you in 1 to 2   weeks with any results.  - Discharge patient to home (ambulatory).   - Patient has a contact number available for emergencies.  The signs and   symptoms of potential delayed complications were discussed with the   patient.  Return to normal activities tomorrow.  Written discharge   instructions were provided to the patient.   - Resume previous diet.   - Continue present medications.   - Return to primary care physician as previously scheduled.   - Repeat colonoscopy at age 43 for screening purposes.  For questions, problems or results please call your physician - Paco Ramachandran MD.  EMERGENCY PHONE NUMBER: [phone],  LAB RESULTS: [phone]  IF A COMPLICATION OR EMERGENCY SITUATION ARISES AND YOU ARE UNABLE TO REACH   YOUR PHYSICIAN - GO DIRECTLY TO THE EMERGENCY ROOM.  Paco Ramachandran MD  9/27/2024 10:03:56 AM  This report has been verified and signed electronically.  Dear patient,  As a result of recent federal legislation (The Federal Cures Act), you may   receive lab or pathology results from your procedure in your [facility]    account before your physician is able to contact you. Your physician or   their representative will relay the results to you with their   recommendations at their soonest availability.  Thank you,  PROVATION

## 2024-09-27 NOTE — H&P
Short Stay Endoscopy History and Physical    PCP - No, Primary Doctor    Procedure - Colonoscopy  ASA - II  Mallampati - per anesthesia  History of Anesthesia problems - no  Family history Anesthesia problems - no     HPI:  This is a 24 y.o. male here for evaluation of : Change in bowel habits    ROS:  Constitutional: No fevers, chills, No weight loss  ENT: No allergies  CV: No chest pain  Pulm: No shortness of breath  GI: see HPI  Derm: No rash    Medical History:  has no past medical history on file.    Surgical History:  has no past surgical history on file.    Family History: family history is not on file.. Otherwise no colon cancer, inflammatory bowel disease, or GI malignancies.    Social History:  reports that he has never smoked. He has never used smokeless tobacco. He reports that he does not drink alcohol and does not use drugs.    Review of patient's allergies indicates:   Allergen Reactions    Advil [ibuprofen]     Aleve [naproxen sodium]        Medications:   Medications Prior to Admission   Medication Sig Dispense Refill Last Dose    acetaminophen (TYLENOL) 500 MG tablet Take 2 tablets (1,000 mg total) by mouth every 6 (six) hours as needed for Pain. 30 tablet 0     ibuprofen (ADVIL,MOTRIN) 600 MG tablet Take 1 tablet (600 mg total) by mouth every 6 (six) hours as needed for Pain (Take with food as needed for mild-to-moderate pain). 20 tablet 0     linaCLOtide (LINZESS) 145 mcg Cap capsule Take 1 capsule (145 mcg total) by mouth before breakfast. 30 capsule 11     omeprazole (PRILOSEC) 40 MG capsule Take 1 capsule (40 mg total) by mouth once daily. 30 capsule 0     polyethylene glycol (GLYCOLAX) 17 gram/dose powder Take 17 g by mouth daily as needed for Constipation. 116 g 0     traMADoL (ULTRAM) 50 mg tablet Take 1 tablet (50 mg total) by mouth every 6 (six) hours as needed for Pain (As needed for severe 10/10 pain). 10 tablet 0          Objective Findings:    Vital Signs: see nursing notes  Physical  "Exam:  General Appearance: Well appearing in no acute distress  Eyes:    No scleral icterus  ENT: Neck supple  Lungs: CTA anteriorly  Heart:  S1, S2 normal, no murmurs heard  Abdomen: Soft, non tender, non distended with positive bowel sounds. No hepatosplenomegaly, ascites, or mass  Extremities: no edema  Skin: No rash      Labs:  No results found for: "WBC", "HGB", "HCT", "PLT", "CHOL", "TRIG", "HDL", "LDLDIRECT", "ALT", "AST", "NA", "K", "CL", "CREATININE", "BUN", "CO2", "TSH", "PSA", "INR", "GLUF", "HGBA1C", "MICROALBUR"    I have explained the risks and benefits of endoscopy procedures to the patient including but not limited to bleeding, perforation, infection, and death.    Paco Ramachandran MD    "

## 2024-09-27 NOTE — TRANSFER OF CARE
"Anesthesia Transfer of Care Note    Patient: Tarik Barton    Procedure(s) Performed: Procedure(s) (LRB):  COLONOSCOPY (N/A)    Patient location: GI    Anesthesia Type: general    Transport from OR: Transported from OR on room air with adequate spontaneous ventilation    Post pain: adequate analgesia    Post assessment: no apparent anesthetic complications and tolerated procedure well    Post vital signs: stable    Level of consciousness: awake    Nausea/Vomiting: no nausea/vomiting    Complications: none    Transfer of care protocol was followed      Last vitals: Visit Vitals  /74 (BP Location: Left arm, Patient Position: Lying)   Pulse 101   Temp 37.2 °C (99 °F) (Skin)   Resp 18   Ht 5' 10" (1.778 m)   Wt 59 kg (130 lb)   SpO2 98%   BMI 18.65 kg/m²     "

## 2024-09-27 NOTE — ANESTHESIA POSTPROCEDURE EVALUATION
Anesthesia Post Evaluation    Patient: Tarik Barton    Procedure(s) Performed: Procedure(s) (LRB):  COLONOSCOPY (N/A)    Final Anesthesia Type: general      Patient location during evaluation: GI PACU  Patient participation: Yes- Able to Participate  Level of consciousness: awake and alert, oriented and awake  Post-procedure vital signs: reviewed and stable  Pain management: adequate  Airway patency: patent  SOFÍA mitigation strategies: Multimodal analgesia  PONV status at discharge: No PONV  Anesthetic complications: no      Cardiovascular status: blood pressure returned to baseline and hemodynamically stable  Respiratory status: unassisted and spontaneous ventilation  Hydration status: euvolemic  Follow-up not needed.              Vitals Value Taken Time   /55 09/27/24 1035   Temp 37 °C (98.6 °F) 09/27/24 1005   Pulse 80 09/27/24 1035   Resp 13 09/27/24 1035   SpO2 100 % 09/27/24 1035         Event Time   Out of Recovery 10:30:00         Pain/Mando Score: Mando Score: 10 (9/27/2024 10:35 AM)

## 2024-11-19 ENCOUNTER — LAB VISIT (OUTPATIENT)
Dept: LAB | Facility: HOSPITAL | Age: 24
End: 2024-11-19

## 2024-11-19 DIAGNOSIS — K59.04 CHRONIC IDIOPATHIC CONSTIPATION: ICD-10-CM

## 2024-11-19 LAB — TSH SERPL DL<=0.005 MIU/L-ACNC: 1.64 UIU/ML (ref 0.4–4)

## 2024-11-19 PROCEDURE — 36415 COLL VENOUS BLD VENIPUNCTURE: CPT

## 2024-11-19 PROCEDURE — 84443 ASSAY THYROID STIM HORMONE: CPT

## 2025-06-06 ENCOUNTER — HOSPITAL ENCOUNTER (EMERGENCY)
Facility: HOSPITAL | Age: 25
Discharge: HOME | End: 2025-06-06
Attending: EMERGENCY MEDICINE

## 2025-06-06 VITALS
DIASTOLIC BLOOD PRESSURE: 72 MMHG | WEIGHT: 130 LBS | HEART RATE: 86 BPM | OXYGEN SATURATION: 100 % | BODY MASS INDEX: 18.61 KG/M2 | SYSTOLIC BLOOD PRESSURE: 122 MMHG | RESPIRATION RATE: 18 BRPM | TEMPERATURE: 99 F | HEIGHT: 70 IN

## 2025-06-06 DIAGNOSIS — R53.83 FATIGUE, UNSPECIFIED TYPE: Primary | ICD-10-CM

## 2025-06-06 DIAGNOSIS — Z72.820 POOR SLEEP: ICD-10-CM

## 2025-06-06 LAB
ABSOLUTE EOSINOPHIL (OHS): 0.01 K/UL
ABSOLUTE MONOCYTE (OHS): 0.33 K/UL (ref 0.3–1)
ABSOLUTE NEUTROPHIL COUNT (OHS): 3 K/UL (ref 1.8–7.7)
ALBUMIN SERPL BCP-MCNC: 4.3 G/DL (ref 3.5–5.2)
ALP SERPL-CCNC: 67 UNIT/L (ref 40–150)
ALT SERPL W/O P-5'-P-CCNC: 16 UNIT/L (ref 10–44)
ANION GAP (OHS): 10 MMOL/L (ref 8–16)
AST SERPL-CCNC: 21 UNIT/L (ref 11–45)
BASOPHILS # BLD AUTO: 0.02 K/UL
BASOPHILS NFR BLD AUTO: 0.4 %
BILIRUB SERPL-MCNC: 1.2 MG/DL (ref 0.1–1)
BUN SERPL-MCNC: 17 MG/DL (ref 6–20)
CALCIUM SERPL-MCNC: 9.2 MG/DL (ref 8.7–10.5)
CHLORIDE SERPL-SCNC: 106 MMOL/L (ref 95–110)
CO2 SERPL-SCNC: 24 MMOL/L (ref 23–29)
CREAT SERPL-MCNC: 1.2 MG/DL (ref 0.5–1.4)
ERYTHROCYTE [DISTWIDTH] IN BLOOD BY AUTOMATED COUNT: 12.1 % (ref 11.5–14.5)
GFR SERPLBLD CREATININE-BSD FMLA CKD-EPI: >60 ML/MIN/1.73/M2
GLUCOSE SERPL-MCNC: 101 MG/DL (ref 70–110)
HCT VFR BLD AUTO: 44 % (ref 40–54)
HGB BLD-MCNC: 14.9 GM/DL (ref 14–18)
IMM GRANULOCYTES # BLD AUTO: 0.01 K/UL (ref 0–0.04)
IMM GRANULOCYTES NFR BLD AUTO: 0.2 % (ref 0–0.5)
LYMPHOCYTES # BLD AUTO: 1.56 K/UL (ref 1–4.8)
MCH RBC QN AUTO: 28.8 PG (ref 27–31)
MCHC RBC AUTO-ENTMCNC: 33.9 G/DL (ref 32–36)
MCV RBC AUTO: 85 FL (ref 82–98)
NUCLEATED RBC (/100WBC) (OHS): 0 /100 WBC
PLATELET # BLD AUTO: 204 K/UL (ref 150–450)
PMV BLD AUTO: 9.2 FL (ref 9.2–12.9)
POTASSIUM SERPL-SCNC: 4.5 MMOL/L (ref 3.5–5.1)
PROT SERPL-MCNC: 8.5 GM/DL (ref 6–8.4)
RBC # BLD AUTO: 5.17 M/UL (ref 4.6–6.2)
RELATIVE EOSINOPHIL (OHS): 0.2 %
RELATIVE LYMPHOCYTE (OHS): 31.6 % (ref 18–48)
RELATIVE MONOCYTE (OHS): 6.7 % (ref 4–15)
RELATIVE NEUTROPHIL (OHS): 60.9 % (ref 38–73)
SODIUM SERPL-SCNC: 140 MMOL/L (ref 136–145)
TSH SERPL-ACNC: 1.61 UIU/ML (ref 0.4–4)
WBC # BLD AUTO: 4.93 K/UL (ref 3.9–12.7)

## 2025-06-06 PROCEDURE — 99283 EMERGENCY DEPT VISIT LOW MDM: CPT

## 2025-06-06 PROCEDURE — 84075 ASSAY ALKALINE PHOSPHATASE: CPT | Performed by: PHYSICIAN ASSISTANT

## 2025-06-06 PROCEDURE — 85025 COMPLETE CBC W/AUTO DIFF WBC: CPT | Performed by: PHYSICIAN ASSISTANT

## 2025-06-06 PROCEDURE — 84443 ASSAY THYROID STIM HORMONE: CPT | Performed by: PHYSICIAN ASSISTANT

## 2025-06-06 NOTE — FIRST PROVIDER EVALUATION
" Emergency Department TeleTriage Encounter Note      CHIEF COMPLAINT    Chief Complaint   Patient presents with    Fatigue     Patient complaining of fatigue ongoing for months. States he wants a check up. Describes it as, "something draining me." Requesting multiple tests in triage.       VITAL SIGNS   Initial Vitals [06/06/25 1015]   BP Pulse Resp Temp SpO2   132/69 98 18 98.3 °F (36.8 °C) 99 %      MAP       --            ALLERGIES    Review of patient's allergies indicates:   Allergen Reactions    Advil [ibuprofen]     Aleve [naproxen sodium]        PROVIDER TRIAGE NOTE  This is a teletriage evaluation of a 25 y.o. male presenting to the ED complaining of fatigue. Patient reports feeling fatigued for several months. He denies other symptoms.    Patient is alert and oriented. He speaks in complete sentences. He is sitting upright in the chair in no distress.     Initial orders will be placed and care will be transferred to an alternate provider when patient is roomed for a full evaluation. Any additional orders and the final disposition will be determined by that provider.         ORDERS  Labs Reviewed   CBC W/ AUTO DIFFERENTIAL    Narrative:     The following orders were created for panel order CBC auto differential.  Procedure                               Abnormality         Status                     ---------                               -----------         ------                     CBC with Differential[[id number]]                                                        Please view results for these tests on the individual orders.   COMPREHENSIVE METABOLIC PANEL   TSH   CBC WITH DIFFERENTIAL       ED Orders (720h ago, onward)      Start Ordered     Status Ordering Provider    06/06/25 1023 06/06/25 1023  CBC auto differential  STAT         Ordered CHIDI MONTILLA    06/06/25 1023 06/06/25 1023  Comprehensive metabolic panel  STAT         Ordered CHIDI MONTILLA    06/06/25 1023 06/06/25 1023  TSH  STAT     "     Ordered CHIDI MONTILLA.    06/06/25 1023 06/06/25 1023  CBC with Differential  PROCEDURE ONCE         Ordered CHIDI MONTILLA.              Virtual Visit Note: The provider triage portion of this emergency department evaluation and documentation was performed via [employer], a HIPAA-compliant telemedicine application, in concert with a tele-presenter in the room. A face to face patient evaluation with one of my colleagues will occur once the patient is placed in an emergency department room.      DISCLAIMER: This note was prepared with [employer] voice recognition transcription software. Garbled syntax, mangled pronouns, and other bizarre constructions may be attributed to that software system.

## 2025-06-06 NOTE — ED NOTES
Pt states that he has been very tired lately. Denies HA, chest pain, ABD pain, N/V/D and/or any other symptoms at this time

## 2025-06-06 NOTE — ED PROVIDER NOTES
"Encounter Date: 6/6/2025    History     Chief Complaint   Patient presents with    Fatigue     Patient complaining of fatigue ongoing for months. States he wants a check up. Describes it as, "something draining me." Requesting multiple tests in triage.        HPI  This is a 25 y.o. male who presents to the Emergency Department with fatigue ongoing for several months.  Patient worried that something is wrong with the stomach.  State that stomach issues had been going on for the past 2 years, suffered with constipation, followed by GI.  Has had CAT scans and x-rays even a colonoscopy which were unremarkable.  States that the constipation has gotten better, but he is still concerned that he feels something on the left midabdomen in his not sure what it is.  Patient reports some worry about it, stress with family and finances as well as work.  He also reports intermittent insomnia.    History obtained for alternative sources:none    Previous or outside records requested and reviewed:  09/27/2024 Colonoscopy unremarkable    07/17/2024 GI visit for chronic idiopathic constipation    Review of patient's allergies indicates:   Allergen Reactions    Advil [ibuprofen]     Aleve [naproxen sodium]      Past Medical History:   Diagnosis Date    Digestive disorder      Past Surgical History:   Procedure Laterality Date    COLONOSCOPY N/A 9/27/2024    Procedure: COLONOSCOPY;  Surgeon: Paco Ramachandran MD;  Location: [facility];  Service: Endoscopy;  Laterality: N/A;  9/23/24-Extended Miralax, precall complete-DS     No family history on file.  Social History[1]    Review of Systems  All other systems reviewed and otherwise negative.    Physical Exam     Initial Vitals [06/06/25 1015]   BP Pulse Resp Temp SpO2   132/69 98 18 98.3 °F (36.8 °C) 99 %      MAP       --         Physical Exam  Constitutional:  Well-developed, well-nourished. No acute distress  HENT:  Normocephalic, atraumatic.   Eyes: Conjunctiva normal. "   Neck: Normal range of motion.  Respiratory:  No respiratory distress or conversational dyspnea.    Cardiovascular: Regular rate and rhythm  GI: non-distended, non-tender, no mass  Musculoskeletal:  No gross deformity.  Normal range of motion of all major joints.  Integument:  Warm and dry. No rash.  Neurologic: Alert and oriented x3, COSBY, no gross deficits, GCS 15  Psychiatric:  Mood normal.       Medical Decision Making:     Differential Diagnosis:  Chronic fatigue, insomnia, poor sleep, ALESSANDRA, chronic dehydration, less likely consider acute intra-abd pathology such as colitis, diverticulitis    _      Plan:  Orders Placed This Encounter    CBC auto differential    Comprehensive metabolic panel    TSH    CBC with Differential        Social determinants of health taken into consideration during development of our treatment plan include: Limited access to healthcare, Limited transportation, and Health Literacy    Procedures  Studies:   Labs:  Labs Reviewed   COMPREHENSIVE METABOLIC PANEL - Abnormal       Result Value    Sodium 140      Potassium 4.5      Chloride 106      CO2 24      Glucose 101      BUN 17      Creatinine 1.2      Calcium 9.2      Protein Total 8.5 (*)     Albumin 4.3      Bilirubin Total 1.2 (*)     ALP 67      AST 21      ALT 16      Anion Gap 10      eGFR >60     TSH - Normal    TSH 1.614     CBC WITH DIFFERENTIAL - Normal    WBC 4.93      RBC 5.17      HGB 14.9      HCT 44.0      MCV 85      MCH 28.8      MCHC 33.9      RDW 12.1      Platelet Count 204      MPV 9.2      Nucleated RBC 0      Neut % 60.9      Lymph % 31.6      Mono % 6.7      Eos % 0.2      Basophil % 0.4      Imm Grans % 0.2      Neut # 3.00      Lymph # 1.56      Mono # 0.33      Eos # 0.01      Baso # 0.02      Imm Grans # 0.01     CBC W/ AUTO DIFFERENTIAL    Narrative:     The following orders were created for panel order CBC auto differential.  Procedure                               Abnormality         Status                      ---------                               -----------         ------                     CBC with Differential[[id number]]       Normal              Final result                 Please view results for these tests on the individual orders.       Imaging:     Imaging Results    None            ED Course:     ED Course as of 06/06/25 1151   Fri Jun 06, 2025   1059 WBC: 4.93 [NP]   1059 Hemoglobin: 14.9 [NP]   1059 Hematocrit: 44.0 [NP]   1059 Platelet Count: 204 [NP]   1149 Sodium: 140 [NP]   1149 Potassium: 4.5 [NP]   1149 Chloride: 106 [NP]   1149 CO2: 24 [NP]   1149 Glucose: 101 [NP]   1149 BUN: 17 [NP]   1149 Creatinine: 1.2 [NP]   1149 Calcium: 9.2 [NP]   1149 PROTEIN TOTAL(!): 8.5 [NP]   1149 Albumin: 4.3 [NP]   1149 BILIRUBIN TOTAL(!): 1.2 [NP]      ED Course User Index  [NP] Rashel Velasco MD             Workup unremarkable other than borderline bilirubin which may be due to fasting.    Clinical impression and plan including any treatment was discussed with patient.   Pt is to call for follow up with PCP or referred physician in 7 days.   Pt is to return to the ED immediately for any new or worsening symptoms.  They are always welcome to return for any emergent concerns.    The patient had time for ask questions to which they were addressed.   The patient expressed understanding and agrees with my plan.       Clinical Impression:   Final diagnoses:  [R53.83] Fatigue, unspecified type (Primary)  [Z72.820] Poor sleep         ED Prescriptions    None       Follow-up Information       Follow up With Specialties Details Why Contact Info Additional Information    [facility] Family Medicine Schedule an appointment as soon as possible for a visit   [address]  [address]  [facility] [address] [phone] Please park in [address] or [address] and use [address]amilla. Take Medical Office Bldg. elevators.          No orders of the defined types were placed in this encounter.      DISCLAIMER:  This note was prepared with [employer] voice recognition transcription software. Garbled syntax, mangled pronouns, and other bizarre constructions may be attributed to that software system.       [1]   Social History  Tobacco Use    Smoking status: Never    Smokeless tobacco: Never   Substance Use Topics    Alcohol use: No    Drug use: No        Rashel Velasco MD  06/06/25 [id number]

## 2025-06-06 NOTE — DISCHARGE INSTRUCTIONS
Get good rest every night and drink plenty of water during the day.    Follow up with the primary care provider as needed for yearly exam is otherwise can follow up sooner if any more pressing issues.      Thank you for coming in to see us today! It was nice to meet you, and I hope you feel better soon. Please feel free to return to the ER at any time should your symptoms get worse, or if you have different emergent concerns.    Our goal in the emergency department is to always give you outstanding care and exceptional service. You may receive a survey by mail or e-mail in the next week regarding your experience in our ED. We would greatly appreciate your completing and returning the survey. Your feedback provides us with a way to recognize our staff who give very good care and it helps us learn how to improve when your experience was below our aspiration of excellence.       Sincerely,    Rashel Velasco MD  Medical Director, Emergency Department  [facility]